# Patient Record
Sex: MALE | Race: WHITE | NOT HISPANIC OR LATINO
[De-identification: names, ages, dates, MRNs, and addresses within clinical notes are randomized per-mention and may not be internally consistent; named-entity substitution may affect disease eponyms.]

---

## 2019-02-25 ENCOUNTER — RECORD ABSTRACTING (OUTPATIENT)
Age: 69
End: 2019-02-25

## 2019-02-25 DIAGNOSIS — Z87.19 PERSONAL HISTORY OF OTHER DISEASES OF THE DIGESTIVE SYSTEM: ICD-10-CM

## 2019-02-25 DIAGNOSIS — Z80.0 FAMILY HISTORY OF MALIGNANT NEOPLASM OF DIGESTIVE ORGANS: ICD-10-CM

## 2019-02-25 DIAGNOSIS — Z78.9 OTHER SPECIFIED HEALTH STATUS: ICD-10-CM

## 2019-06-24 ENCOUNTER — NON-APPOINTMENT (OUTPATIENT)
Age: 69
End: 2019-06-24

## 2019-06-24 ENCOUNTER — APPOINTMENT (OUTPATIENT)
Dept: INTERNAL MEDICINE | Facility: CLINIC | Age: 69
End: 2019-06-24
Payer: MEDICARE

## 2019-06-24 VITALS
WEIGHT: 192.5 LBS | HEIGHT: 68 IN | DIASTOLIC BLOOD PRESSURE: 78 MMHG | BODY MASS INDEX: 29.18 KG/M2 | OXYGEN SATURATION: 98 % | SYSTOLIC BLOOD PRESSURE: 110 MMHG | RESPIRATION RATE: 16 BRPM | HEART RATE: 59 BPM

## 2019-06-24 VITALS — TEMPERATURE: 98 F

## 2019-06-24 DIAGNOSIS — Z78.9 OTHER SPECIFIED HEALTH STATUS: ICD-10-CM

## 2019-06-24 DIAGNOSIS — Z86.010 PERSONAL HISTORY OF COLONIC POLYPS: ICD-10-CM

## 2019-06-24 PROCEDURE — 93000 ELECTROCARDIOGRAM COMPLETE: CPT

## 2019-06-24 PROCEDURE — 36415 COLL VENOUS BLD VENIPUNCTURE: CPT

## 2019-06-24 PROCEDURE — 94010 BREATHING CAPACITY TEST: CPT

## 2019-06-24 PROCEDURE — G0439: CPT

## 2019-06-24 NOTE — PHYSICAL EXAM
[No Acute Distress] : no acute distress [Well Nourished] : well nourished [Well-Appearing] : well-appearing [Well Developed] : well developed [Normal Sclera/Conjunctiva] : normal sclera/conjunctiva [PERRL] : pupils equal round and reactive to light [EOMI] : extraocular movements intact [Normal Oropharynx] : the oropharynx was normal [Normal Outer Ear/Nose] : the outer ears and nose were normal in appearance [No JVD] : no jugular venous distention [Supple] : supple [No Lymphadenopathy] : no lymphadenopathy [Thyroid Normal, No Nodules] : the thyroid was normal and there were no nodules present [No Respiratory Distress] : no respiratory distress  [Clear to Auscultation] : lungs were clear to auscultation bilaterally [No Accessory Muscle Use] : no accessory muscle use [Normal Rate] : normal rate  [Regular Rhythm] : with a regular rhythm [Normal S1, S2] : normal S1 and S2 [No Murmur] : no murmur heard [No Carotid Bruits] : no carotid bruits [No Abdominal Bruit] : a ~M bruit was not heard ~T in the abdomen [No Varicosities] : no varicosities [Pedal Pulses Present] : the pedal pulses are present [No Edema] : there was no peripheral edema [No Palpable Aorta] : no palpable aorta [No Extremity Clubbing/Cyanosis] : no extremity clubbing/cyanosis [Non Tender] : non-tender [Soft] : abdomen soft [No Masses] : no abdominal mass palpated [Non-distended] : non-distended [No HSM] : no HSM [Normal Bowel Sounds] : normal bowel sounds [Normal Posterior Cervical Nodes] : no posterior cervical lymphadenopathy [Normal Anterior Cervical Nodes] : no anterior cervical lymphadenopathy [No CVA Tenderness] : no CVA  tenderness [No Joint Swelling] : no joint swelling [No Spinal Tenderness] : no spinal tenderness [Grossly Normal Strength/Tone] : grossly normal strength/tone [Normal Gait] : normal gait [No Rash] : no rash [Coordination Grossly Intact] : coordination grossly intact [No Focal Deficits] : no focal deficits [Deep Tendon Reflexes (DTR)] : deep tendon reflexes were 2+ and symmetric [Normal Affect] : the affect was normal [Normal Insight/Judgement] : insight and judgment were intact [No Stool to Guaiac] : no stool to guaiac [No Mass] : no mass [FreeTextEntry1] : External hemorrhoids; prostate normal
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
## 2019-06-24 NOTE — PLAN
[FreeTextEntry1] : Pulmonary\par dyspnea on exertion - check EKG/PFT (results as above) - likely secondary to lack of CV exercise/deconditioning and history of smoking - recommended increased CV exercise\par current every day smoker - check PFT (results as above) - discussed starting Chantix \par Gastroenterology \par screening colonoscopy - patient to follow up for referral to gastroenterologist as he will be due in 1 year given history of colonic polyps and family history of colon cancer \par Infectious Disease\par Tetanus Vaccine - discussed, refused \par \par check EKG, PFT, male panel, UA  \par \par  \par

## 2019-06-24 NOTE — ADDENDUM
[FreeTextEntry1] : I, Blayne Pedroza, acted solely as scribe for Dr. Jaspal Bermeo DO on this date 06/24/2019 10:00AM .\par \par All medical record entries made by the Scribe were at my, Dr. Jaspal Bermeo DO direction and personally dictated by me on 06/24/2019 10:00AM. I have reviewed the chart and agree that the record accurately reflects my personal performance of the history, physical exam, assessment and plan. I have also personally directed, reviewed and agreed with the chart.\par

## 2019-06-24 NOTE — ASSESSMENT
[FreeTextEntry1] : Patient is a 68 year old male with a past medical history as above who presents for annual physical exam.

## 2019-06-30 LAB
25(OH)D3 SERPL-MCNC: 32.3 NG/ML
ALBUMIN SERPL ELPH-MCNC: 4.8 G/DL
ALP BLD-CCNC: 60 U/L
ALT SERPL-CCNC: 17 U/L
ANION GAP SERPL CALC-SCNC: 13 MMOL/L
APPEARANCE: CLEAR
AST SERPL-CCNC: 13 U/L
BASOPHILS # BLD AUTO: 0.03 K/UL
BASOPHILS NFR BLD AUTO: 0.5 %
BILIRUB SERPL-MCNC: 0.5 MG/DL
BILIRUBIN URINE: NEGATIVE
BLOOD URINE: NEGATIVE
BUN SERPL-MCNC: 15 MG/DL
CALCIUM SERPL-MCNC: 9.9 MG/DL
CHLORIDE SERPL-SCNC: 106 MMOL/L
CHOLEST SERPL-MCNC: 120 MG/DL
CHOLEST/HDLC SERPL: 3.2 RATIO
CO2 SERPL-SCNC: 26 MMOL/L
COLOR: NORMAL
CREAT SERPL-MCNC: 1.07 MG/DL
EOSINOPHIL # BLD AUTO: 0.41 K/UL
EOSINOPHIL NFR BLD AUTO: 7.2 %
ESTIMATED AVERAGE GLUCOSE: 120 MG/DL
GLUCOSE QUALITATIVE U: NEGATIVE
GLUCOSE SERPL-MCNC: 103 MG/DL
HBA1C MFR BLD HPLC: 5.8 %
HCT VFR BLD CALC: 48.4 %
HDLC SERPL-MCNC: 37 MG/DL
HGB BLD-MCNC: 15.4 G/DL
IMM GRANULOCYTES NFR BLD AUTO: 0 %
KETONES URINE: NEGATIVE
LDLC SERPL CALC-MCNC: 65 MG/DL
LEUKOCYTE ESTERASE URINE: NEGATIVE
LYMPHOCYTES # BLD AUTO: 1.36 K/UL
LYMPHOCYTES NFR BLD AUTO: 23.9 %
MAN DIFF?: NORMAL
MCHC RBC-ENTMCNC: 29.7 PG
MCHC RBC-ENTMCNC: 31.8 GM/DL
MCV RBC AUTO: 93.4 FL
MONOCYTES # BLD AUTO: 0.52 K/UL
MONOCYTES NFR BLD AUTO: 9.1 %
NEUTROPHILS # BLD AUTO: 3.38 K/UL
NEUTROPHILS NFR BLD AUTO: 59.3 %
NITRITE URINE: NEGATIVE
PH URINE: 5
PLATELET # BLD AUTO: 121 K/UL
POTASSIUM SERPL-SCNC: 4.6 MMOL/L
PROT SERPL-MCNC: 7.3 G/DL
PROTEIN URINE: NEGATIVE
PSA SERPL-MCNC: 2.98 NG/ML
RBC # BLD: 5.18 M/UL
RBC # FLD: 13.5 %
SODIUM SERPL-SCNC: 145 MMOL/L
SPECIFIC GRAVITY URINE: 1.02
TRIGL SERPL-MCNC: 89 MG/DL
TSH SERPL-ACNC: 2.17 UIU/ML
UROBILINOGEN URINE: NORMAL
WBC # FLD AUTO: 5.7 K/UL

## 2020-06-24 ENCOUNTER — NON-APPOINTMENT (OUTPATIENT)
Age: 70
End: 2020-06-24

## 2020-06-24 ENCOUNTER — LABORATORY RESULT (OUTPATIENT)
Age: 70
End: 2020-06-24

## 2020-06-24 ENCOUNTER — APPOINTMENT (OUTPATIENT)
Dept: INTERNAL MEDICINE | Facility: CLINIC | Age: 70
End: 2020-06-24
Payer: MEDICARE

## 2020-06-24 VITALS
OXYGEN SATURATION: 97 % | WEIGHT: 188 LBS | HEIGHT: 68 IN | TEMPERATURE: 98.2 F | BODY MASS INDEX: 28.49 KG/M2 | RESPIRATION RATE: 16 BRPM | SYSTOLIC BLOOD PRESSURE: 110 MMHG | HEART RATE: 65 BPM | DIASTOLIC BLOOD PRESSURE: 66 MMHG

## 2020-06-24 PROCEDURE — G0439: CPT

## 2020-06-24 PROCEDURE — 36415 COLL VENOUS BLD VENIPUNCTURE: CPT

## 2020-06-24 PROCEDURE — 93000 ELECTROCARDIOGRAM COMPLETE: CPT

## 2020-06-24 NOTE — PLAN
[FreeTextEntry1] : Pulmonary\par current every-day smoker - check EKG (results as above) - advised decreased smoking/smoking cessation\par Endocrinology\par hyperglycemia - continue low carbohydrate/low sugar diet - check hemoglobin A1C\par Hematology\par thrombocytopenia - check CBC\par \par check EKG (results as above), male panel, and UA

## 2020-06-24 NOTE — PHYSICAL EXAM
[No Acute Distress] : no acute distress [Well Developed] : well developed [Well Nourished] : well nourished [Well-Appearing] : well-appearing [Normal Sclera/Conjunctiva] : normal sclera/conjunctiva [PERRL] : pupils equal round and reactive to light [Normal Outer Ear/Nose] : the outer ears and nose were normal in appearance [EOMI] : extraocular movements intact [Normal Oropharynx] : the oropharynx was normal [No Lymphadenopathy] : no lymphadenopathy [Supple] : supple [No JVD] : no jugular venous distention [Thyroid Normal, No Nodules] : the thyroid was normal and there were no nodules present [No Respiratory Distress] : no respiratory distress  [No Accessory Muscle Use] : no accessory muscle use [Clear to Auscultation] : lungs were clear to auscultation bilaterally [Normal Rate] : normal rate  [Regular Rhythm] : with a regular rhythm [No Murmur] : no murmur heard [Normal S1, S2] : normal S1 and S2 [No Carotid Bruits] : no carotid bruits [No Abdominal Bruit] : a ~M bruit was not heard ~T in the abdomen [No Varicosities] : no varicosities [Pedal Pulses Present] : the pedal pulses are present [No Palpable Aorta] : no palpable aorta [No Extremity Clubbing/Cyanosis] : no extremity clubbing/cyanosis [No Edema] : there was no peripheral edema [Non Tender] : non-tender [Soft] : abdomen soft [No Masses] : no abdominal mass palpated [Non-distended] : non-distended [No HSM] : no HSM [Normal Bowel Sounds] : normal bowel sounds [Normal Anterior Cervical Nodes] : no anterior cervical lymphadenopathy [Normal Posterior Cervical Nodes] : no posterior cervical lymphadenopathy [No Spinal Tenderness] : no spinal tenderness [No CVA Tenderness] : no CVA  tenderness [Grossly Normal Strength/Tone] : grossly normal strength/tone [No Joint Swelling] : no joint swelling [No Rash] : no rash [Coordination Grossly Intact] : coordination grossly intact [No Focal Deficits] : no focal deficits [Normal Gait] : normal gait [Normal Affect] : the affect was normal [Deep Tendon Reflexes (DTR)] : deep tendon reflexes were 2+ and symmetric [Normal Insight/Judgement] : insight and judgment were intact [de-identified] : overweight

## 2020-06-24 NOTE — ASSESSMENT
[FreeTextEntry1] : Patient is a 69 year old male with a past medical history as above who presents for an annual wellness visit.

## 2020-06-24 NOTE — ADDENDUM
[FreeTextEntry1] : I, Blayne Pedroza, acted solely as scribe for Dr. Jaspal Beremo DO on this date 06/24/2020 10:10AM .\par \par All medical record entries made by the Scribe were at my, Dr. Jaspal Bermeo DO direction and personally dictated by me on 06/24/2020 10:10AM. I have reviewed the chart and agree that the record accurately reflects my personal performance of the history, physical exam, assessment and plan. I have also personally directed, reviewed and agreed with the chart.\par

## 2020-06-24 NOTE — HISTORY OF PRESENT ILLNESS
[de-identified] : Patient is a 69 year old male with a past medical history as below who presents for an annual wellness visit. Patient is not currently taking any prescription medications. Patient notes increased stress secondary to the quarantine. He states he rescheduled his trip to [place] due to COVID-19. Patient is a current every-day smoker, but is not willing to quit smoking.  [FreeTextEntry1] : annual wellness visit

## 2020-06-25 LAB
25(OH)D3 SERPL-MCNC: 32.1 NG/ML
ALBUMIN SERPL ELPH-MCNC: 4.6 G/DL
ALP BLD-CCNC: 52 U/L
ALT SERPL-CCNC: 16 U/L
ANION GAP SERPL CALC-SCNC: 11 MMOL/L
APPEARANCE: CLEAR
AST SERPL-CCNC: 18 U/L
BASOPHILS # BLD AUTO: 0.03 K/UL
BASOPHILS NFR BLD AUTO: 0.5 %
BILIRUB SERPL-MCNC: 0.5 MG/DL
BILIRUBIN URINE: NEGATIVE
BLOOD URINE: ABNORMAL
BUN SERPL-MCNC: 11 MG/DL
CALCIUM SERPL-MCNC: 9.6 MG/DL
CHLORIDE SERPL-SCNC: 105 MMOL/L
CHOLEST SERPL-MCNC: 127 MG/DL
CHOLEST/HDLC SERPL: 3.5 RATIO
CO2 SERPL-SCNC: 24 MMOL/L
COLOR: YELLOW
CREAT SERPL-MCNC: 0.95 MG/DL
EOSINOPHIL # BLD AUTO: 0.42 K/UL
EOSINOPHIL NFR BLD AUTO: 6.8 %
ESTIMATED AVERAGE GLUCOSE: 117 MG/DL
GLUCOSE QUALITATIVE U: NEGATIVE
GLUCOSE SERPL-MCNC: 99 MG/DL
HBA1C MFR BLD HPLC: 5.7 %
HCT VFR BLD CALC: 49.2 %
HDLC SERPL-MCNC: 37 MG/DL
HGB BLD-MCNC: 15.4 G/DL
IMM GRANULOCYTES NFR BLD AUTO: 0.2 %
KETONES URINE: NEGATIVE
LDLC SERPL CALC-MCNC: 72 MG/DL
LEUKOCYTE ESTERASE URINE: NEGATIVE
LYMPHOCYTES # BLD AUTO: 1.39 K/UL
LYMPHOCYTES NFR BLD AUTO: 22.5 %
MAN DIFF?: NORMAL
MCHC RBC-ENTMCNC: 29.6 PG
MCHC RBC-ENTMCNC: 31.3 GM/DL
MCV RBC AUTO: 94.4 FL
MONOCYTES # BLD AUTO: 0.54 K/UL
MONOCYTES NFR BLD AUTO: 8.7 %
NEUTROPHILS # BLD AUTO: 3.79 K/UL
NEUTROPHILS NFR BLD AUTO: 61.3 %
NITRITE URINE: NEGATIVE
PH URINE: 5.5
PLATELET # BLD AUTO: 117 K/UL
POTASSIUM SERPL-SCNC: 4.6 MMOL/L
PROT SERPL-MCNC: 6.9 G/DL
PROTEIN URINE: NEGATIVE
PSA SERPL-MCNC: 2.86 NG/ML
RBC # BLD: 5.21 M/UL
RBC # FLD: 13.4 %
SODIUM SERPL-SCNC: 140 MMOL/L
SPECIFIC GRAVITY URINE: 1.02
TRIGL SERPL-MCNC: 92 MG/DL
TSH SERPL-ACNC: 2.22 UIU/ML
UROBILINOGEN URINE: NORMAL
WBC # FLD AUTO: 6.18 K/UL

## 2021-03-01 ENCOUNTER — APPOINTMENT (OUTPATIENT)
Dept: INTERNAL MEDICINE | Facility: CLINIC | Age: 71
End: 2021-03-01
Payer: MEDICARE

## 2021-03-01 VITALS
RESPIRATION RATE: 13 BRPM | SYSTOLIC BLOOD PRESSURE: 101 MMHG | WEIGHT: 190 LBS | TEMPERATURE: 97.7 F | BODY MASS INDEX: 28.79 KG/M2 | OXYGEN SATURATION: 98 % | DIASTOLIC BLOOD PRESSURE: 70 MMHG | HEIGHT: 68 IN | HEART RATE: 71 BPM

## 2021-03-01 DIAGNOSIS — M79.671 PAIN IN RIGHT FOOT: ICD-10-CM

## 2021-03-01 PROCEDURE — 36415 COLL VENOUS BLD VENIPUNCTURE: CPT

## 2021-03-01 PROCEDURE — 99213 OFFICE O/P EST LOW 20 MIN: CPT | Mod: 25

## 2021-03-01 NOTE — HISTORY OF PRESENT ILLNESS
[FreeTextEntry8] : Patient is a 70 year old male with a past medical history as below who presents for right foot pain (dorsal aspect) that started this morning. He states the pain radiates up to his right ankle. He characterizes the pain as sharp. He states the pain occurs every several minutes and resolves on its own. He denies any exacerbating/alleviating factors. He took 2 doses of Advil this morning without relief. Patient had a screening colonoscopy with gastroenterologist, Dr. Parmar in June/July 2020. Patient did not receive the flu vaccine for this season. He has not received the Pneumonia Vaccine. He notes recently receiving the 2nd dose of the COVID-19 Vaccine.

## 2021-03-01 NOTE — PLAN
[FreeTextEntry1] : Podiatry/Musculoskeletal\par right foot pain - start Methylprednisolone 4mg p.o. as directed, Rx filled - check serum uric acid to r/o gout - Rx given for X-Ray of right foot; imaging to be done pending results of blood work - referred to podiatrist, Dr. Maldonado for further evaluation\par Pulmonary\par current every-day smoker - previously discussed importance of smoking cessation in reducing CV risk\par Endocrinology\par hyperglycemia - continue low carbohydrate/low sugar diet \par Gastroenterology\par colonoscopy - results of last screening to be requested from gastroenterologist, Dr. Parmar's office\par Immunization\par Pneumovax 23 - discussed, vaccine to be administered during future visit; Prevnar 13 to be administered 1 year later\par \par check serum uric acid

## 2021-03-01 NOTE — PHYSICAL EXAM
[No Acute Distress] : no acute distress [Well Nourished] : well nourished [Well Developed] : well developed [Well-Appearing] : well-appearing [Normal Sclera/Conjunctiva] : normal sclera/conjunctiva [PERRL] : pupils equal round and reactive to light [EOMI] : extraocular movements intact [Normal Outer Ear/Nose] : the outer ears and nose were normal in appearance [Normal Oropharynx] : the oropharynx was normal [No JVD] : no jugular venous distention [No Lymphadenopathy] : no lymphadenopathy [Supple] : supple [Thyroid Normal, No Nodules] : the thyroid was normal and there were no nodules present [No Respiratory Distress] : no respiratory distress  [No Accessory Muscle Use] : no accessory muscle use [Clear to Auscultation] : lungs were clear to auscultation bilaterally [Normal Rate] : normal rate  [Regular Rhythm] : with a regular rhythm [Normal S1, S2] : normal S1 and S2 [No Murmur] : no murmur heard [No Carotid Bruits] : no carotid bruits [No Abdominal Bruit] : a ~M bruit was not heard ~T in the abdomen [No Varicosities] : no varicosities [Pedal Pulses Present] : the pedal pulses are present [No Edema] : there was no peripheral edema [No Palpable Aorta] : no palpable aorta [No Extremity Clubbing/Cyanosis] : no extremity clubbing/cyanosis [Soft] : abdomen soft [Non Tender] : non-tender [Non-distended] : non-distended [No Masses] : no abdominal mass palpated [No HSM] : no HSM [Normal Bowel Sounds] : normal bowel sounds [Normal Posterior Cervical Nodes] : no posterior cervical lymphadenopathy [Normal Anterior Cervical Nodes] : no anterior cervical lymphadenopathy [No CVA Tenderness] : no CVA  tenderness [No Spinal Tenderness] : no spinal tenderness [No Joint Swelling] : no joint swelling [Grossly Normal Strength/Tone] : grossly normal strength/tone [No Rash] : no rash [Coordination Grossly Intact] : coordination grossly intact [No Focal Deficits] : no focal deficits [Normal Gait] : normal gait [Deep Tendon Reflexes (DTR)] : deep tendon reflexes were 2+ and symmetric [Normal Affect] : the affect was normal [Normal Insight/Judgement] : insight and judgment were intact [de-identified] : overweight

## 2021-03-01 NOTE — ASSESSMENT
[FreeTextEntry1] : Patient is a 70 year old male with a past medical history as above who presents for right foot pain.

## 2021-03-01 NOTE — ADDENDUM
[FreeTextEntry1] : I, Blayne Pedroza, acted solely as scribe for Dr. Jaspal Bermeo DO on this date 03/01/2021  1:00PM .\par \par All medical record entries made by the Scribe were at my, Dr. Jaspal Bermeo DO direction and personally dictated by me on 03/01/2021  1:00PM. I have reviewed the chart and agree that the record accurately reflects my personal performance of the history, physical exam, assessment and plan. I have also personally directed, reviewed and agreed with the chart.\par

## 2021-03-02 LAB — URATE SERPL-MCNC: 4.8 MG/DL

## 2021-05-23 ENCOUNTER — EMERGENCY (EMERGENCY)
Facility: HOSPITAL | Age: 71
LOS: 1 days | Discharge: HOME | End: 2021-05-23
Attending: EMERGENCY MEDICINE | Admitting: EMERGENCY MEDICINE
Payer: MEDICARE

## 2021-05-23 VITALS
DIASTOLIC BLOOD PRESSURE: 76 MMHG | HEART RATE: 75 BPM | SYSTOLIC BLOOD PRESSURE: 139 MMHG | HEIGHT: 70 IN | RESPIRATION RATE: 22 BRPM | WEIGHT: 188.05 LBS | TEMPERATURE: 98 F | OXYGEN SATURATION: 94 %

## 2021-05-23 PROCEDURE — 71045 X-RAY EXAM CHEST 1 VIEW: CPT | Mod: 26

## 2021-05-23 PROCEDURE — 99284 EMERGENCY DEPT VISIT MOD MDM: CPT | Mod: CS

## 2021-05-23 RX ORDER — IPRATROPIUM/ALBUTEROL SULFATE 18-103MCG
3 AEROSOL WITH ADAPTER (GRAM) INHALATION ONCE
Refills: 0 | Status: COMPLETED | OUTPATIENT
Start: 2021-05-23 | End: 2021-05-23

## 2021-05-23 RX ADMIN — Medication 3 MILLILITER(S): at 23:46

## 2021-05-23 NOTE — ED ADULT NURSE NOTE - DISCHARGE DATE/TIME
States that his left thigh became numb today while on a plane from [place]. No redness or swelling noted 24-May-2021 01:15

## 2021-05-23 NOTE — ED PROVIDER NOTE - PATIENT PORTAL LINK FT
You can access the FollowMyHealth Patient Portal offered by [facility] by registering at the following website: [email or web address]. By joining [facility]’s FollowMyHealth portal, you will also be able to view your health information using other applications (apps) compatible with our system.

## 2021-05-23 NOTE — ED ADULT NURSE NOTE - OBJECTIVE STATEMENT
pt walked in c/o shortness of breath all day with infrequent cough, denies fever/chills or sick contacts.

## 2021-05-23 NOTE — ED PROVIDER NOTE - CARE PROVIDER_API CALL
Jack Paige)  Critical Care Medicine; Internal Medicine; Pulmonary Disease  [address] [place] [address]  Phone: [phone]  Fax: [phone]  Follow Up Time: 1-3 Days

## 2021-05-23 NOTE — ED PROVIDER NOTE - PROGRESS NOTE DETAILS
Patient states he feels much better and wants to go home. Some mild residual expiratory wheezing. Patient knows to return if worsening

## 2021-05-24 VITALS
SYSTOLIC BLOOD PRESSURE: 145 MMHG | HEART RATE: 74 BPM | TEMPERATURE: 98 F | DIASTOLIC BLOOD PRESSURE: 74 MMHG | OXYGEN SATURATION: 97 % | RESPIRATION RATE: 18 BRPM

## 2021-05-24 LAB — SARS-COV-2 RNA SPEC QL NAA+PROBE: SIGNIFICANT CHANGE UP

## 2021-05-24 PROCEDURE — 87635 SARS-COV-2 COVID-19 AMP PRB: CPT

## 2021-05-24 PROCEDURE — 94640 AIRWAY INHALATION TREATMENT: CPT

## 2021-05-24 PROCEDURE — 99283 EMERGENCY DEPT VISIT LOW MDM: CPT | Mod: 25

## 2021-05-24 PROCEDURE — 71045 X-RAY EXAM CHEST 1 VIEW: CPT

## 2021-05-24 RX ORDER — IPRATROPIUM/ALBUTEROL SULFATE 18-103MCG
3 AEROSOL WITH ADAPTER (GRAM) INHALATION ONCE
Refills: 0 | Status: COMPLETED | OUTPATIENT
Start: 2021-05-24 | End: 2021-05-24

## 2021-05-24 RX ORDER — ALBUTEROL 90 UG/1
2 AEROSOL, METERED ORAL
Qty: 1 | Refills: 0
Start: 2021-05-24

## 2021-05-24 RX ORDER — ALBUTEROL 90 UG/1
2.5 AEROSOL, METERED ORAL ONCE
Refills: 0 | Status: COMPLETED | OUTPATIENT
Start: 2021-05-24 | End: 2021-05-24

## 2021-05-24 RX ORDER — ALBUTEROL 90 UG/1
2 AEROSOL, METERED ORAL
Refills: 0 | Status: DISCONTINUED | OUTPATIENT
Start: 2021-05-24 | End: 2021-05-27

## 2021-05-24 RX ADMIN — Medication 3 MILLILITER(S): at 00:30

## 2021-05-24 RX ADMIN — Medication 3 MILLILITER(S): at 00:43

## 2021-05-24 RX ADMIN — Medication 60 MILLIGRAM(S): at 00:21

## 2021-05-24 RX ADMIN — ALBUTEROL 2 PUFF(S): 90 AEROSOL, METERED ORAL at 01:12

## 2021-05-24 RX ADMIN — ALBUTEROL 2.5 MILLIGRAM(S): 90 AEROSOL, METERED ORAL at 00:52

## 2021-05-24 NOTE — ED ADULT NURSE REASSESSMENT NOTE - NS ED NURSE REASSESS COMMENT FT1
wheezing improved after nebulization, VSS, no distress noted, pt reevaluated by MD, pt d/c home with f/u instructions.

## 2021-06-01 RX ORDER — BUDESONIDE AND FORMOTEROL FUMARATE DIHYDRATE 160; 4.5 UG/1; UG/1
160-4.5 AEROSOL RESPIRATORY (INHALATION)
Qty: 1 | Refills: 2 | Status: DISCONTINUED | COMMUNITY
Start: 2021-05-31 | End: 2021-06-01

## 2021-08-25 ENCOUNTER — NON-APPOINTMENT (OUTPATIENT)
Age: 71
End: 2021-08-25

## 2021-08-25 ENCOUNTER — APPOINTMENT (OUTPATIENT)
Dept: INTERNAL MEDICINE | Facility: CLINIC | Age: 71
End: 2021-08-25
Payer: MEDICARE

## 2021-08-25 VITALS
TEMPERATURE: 97.3 F | DIASTOLIC BLOOD PRESSURE: 70 MMHG | OXYGEN SATURATION: 98 % | BODY MASS INDEX: 28.85 KG/M2 | SYSTOLIC BLOOD PRESSURE: 118 MMHG | RESPIRATION RATE: 16 BRPM | HEART RATE: 65 BPM | WEIGHT: 190.4 LBS | HEIGHT: 68 IN

## 2021-08-25 DIAGNOSIS — Z20.822 CONTACT WITH AND (SUSPECTED) EXPOSURE TO COVID-19: ICD-10-CM

## 2021-08-25 PROCEDURE — 36415 COLL VENOUS BLD VENIPUNCTURE: CPT

## 2021-08-25 PROCEDURE — G0442 ANNUAL ALCOHOL SCREEN 15 MIN: CPT | Mod: 59

## 2021-08-25 PROCEDURE — G0439: CPT

## 2021-08-25 PROCEDURE — 93000 ELECTROCARDIOGRAM COMPLETE: CPT | Mod: 59

## 2021-08-25 RX ORDER — METHYLPREDNISOLONE 4 MG/1
4 TABLET ORAL
Qty: 1 | Refills: 0 | Status: DISCONTINUED | COMMUNITY
Start: 2021-03-01 | End: 2021-08-25

## 2021-08-25 RX ORDER — IPRATROPIUM BROMIDE 17 UG/1
17 AEROSOL, METERED RESPIRATORY (INHALATION) 4 TIMES DAILY
Qty: 1 | Refills: 2 | Status: DISCONTINUED | COMMUNITY
Start: 2021-06-01 | End: 2021-08-25

## 2021-08-25 NOTE — PHYSICAL EXAM
[No Acute Distress] : no acute distress [Well Nourished] : well nourished [Well Developed] : well developed [Well-Appearing] : well-appearing [Normal Sclera/Conjunctiva] : normal sclera/conjunctiva [PERRL] : pupils equal round and reactive to light [EOMI] : extraocular movements intact [Normal Outer Ear/Nose] : the outer ears and nose were normal in appearance [Normal Oropharynx] : the oropharynx was normal [No JVD] : no jugular venous distention [No Lymphadenopathy] : no lymphadenopathy [Supple] : supple [Thyroid Normal, No Nodules] : the thyroid was normal and there were no nodules present [No Respiratory Distress] : no respiratory distress  [No Accessory Muscle Use] : no accessory muscle use [Clear to Auscultation] : lungs were clear to auscultation bilaterally [Normal Rate] : normal rate  [Regular Rhythm] : with a regular rhythm [Normal S1, S2] : normal S1 and S2 [No Murmur] : no murmur heard [No Carotid Bruits] : no carotid bruits [No Abdominal Bruit] : a ~M bruit was not heard ~T in the abdomen [No Varicosities] : no varicosities [Pedal Pulses Present] : the pedal pulses are present [No Edema] : there was no peripheral edema [No Palpable Aorta] : no palpable aorta [No Extremity Clubbing/Cyanosis] : no extremity clubbing/cyanosis [Soft] : abdomen soft [Non Tender] : non-tender [Non-distended] : non-distended [No Masses] : no abdominal mass palpated [No HSM] : no HSM [Normal Bowel Sounds] : normal bowel sounds [Normal Posterior Cervical Nodes] : no posterior cervical lymphadenopathy [Normal Anterior Cervical Nodes] : no anterior cervical lymphadenopathy [No CVA Tenderness] : no CVA  tenderness [No Spinal Tenderness] : no spinal tenderness [No Joint Swelling] : no joint swelling [Grossly Normal Strength/Tone] : grossly normal strength/tone [No Rash] : no rash [Coordination Grossly Intact] : coordination grossly intact [No Focal Deficits] : no focal deficits [Normal Gait] : normal gait [Deep Tendon Reflexes (DTR)] : deep tendon reflexes were 2+ and symmetric [Normal Affect] : the affect was normal [Normal Insight/Judgement] : insight and judgment were intact [de-identified] : overweight

## 2021-08-25 NOTE — ASSESSMENT
[FreeTextEntry1] : Patient is a 70 year old male with a past medical history as above who presents for an annual wellness visit.\par

## 2021-08-25 NOTE — HISTORY OF PRESENT ILLNESS
[FreeTextEntry1] : annual wellness visit [de-identified] : Patient is a 70 year old male with a past medical history as below who presents for an annual wellness visit. Patient is not currently taking any prescription medications. Patient had a screening colonoscopy with gastroenterologist, Dr. Parmar in June/July 2020. Patient notes minimal CV activity (walking, golf). Patient did not receive the flu vaccine last year. He has not received the Pneumonia Vaccine. He has not received the Tdap/Td Vaccine in the past 10 years. He has not received the Shingles (Shingrix) Vaccine. Patient has received both doses of the COVID-19 Vaccine (Pfizer). He inquires about testing for COVID-19 Antibodies with blood work today. Patient is a current every-day smoker.

## 2021-08-25 NOTE — HEALTH RISK ASSESSMENT
[] : Yes [No] : In the past 12 months have you used drugs other than those required for medical reasons? No [0] : 1) Little interest or pleasure doing things: Not at all (0) [PHQ-2 Negative - No further assessment needed] : PHQ-2 Negative - No further assessment needed [Fully functional (bathing, dressing, toileting, transferring, walking, feeding)] : Fully functional (bathing, dressing, toileting, transferring, walking, feeding) [Fully functional (using the telephone, shopping, preparing meals, housekeeping, doing laundry, using] : Fully functional and needs no help or supervision to perform IADLs (using the telephone, shopping, preparing meals, housekeeping, doing laundry, using transportation, managing medications and managing finances) [de-identified] : Daily or almost daily.  [Audit-CScore] : 0 [de-identified] : Walking, golf.  [TEI0Nbpqs] : 0 [ColonoscopyComments] : Results of last screening to be requested from Dr. Parmra's office.

## 2021-08-25 NOTE — ADDENDUM
[FreeTextEntry1] : I, Blayne Pedroza, acted solely as scribe for Dr. Jaspal Bermeo DO on this date 08/25/2021 12:10PM .\par \par All medical record entries made by the Scribe were at my, Dr. Jaspal Bermeo DO direction and personally dictated by me on 08/25/2021 12:10PM. I have reviewed the chart and agree that the record accurately reflects my personal performance of the history, physical exam, assessment and plan. I have also personally directed, reviewed and agreed with the chart.\par

## 2021-08-25 NOTE — PLAN
[FreeTextEntry1] : Pulmonary\par current every-day smoker - check EKG (results as above) - again discussed the importance of smoking cessation in reducing risk for CV disease; discussed starting Chantix, patient to consider and follow up when ready/willing to quit smoking\par Endocrinology\par hyperglycemia - continue low carbohydrate/low sugar diet - check hemoglobin A1C\par Hematology\par thrombocytopenia - check CBC\par Gastroenterology\par Results of last screening colonoscopy to be requested from gastroenterologist, Dr. Parmar's office\par Immunization\par Pneumovax 23/Prevnar 13 - again discussed, patient to consider and follow up\par Tdap (Adacel) Vaccine - again discussed, declined\par Shingrix - again discussed, patient to consider and follow up\par Immunization/Infectious Disease\par S/p COVID-19 Vaccine (Pfizer, x2) - check COVID-19 James Domain Antibody and COVID-19 Nucleocapsid Antibody;  Recommended receiving 3rd dose of the COVID-19 Vaccine (Pfizer) 8 months after date of 2nd dose\par \par check EKG (results as above), male panel, hemoglobin A1C, COVID-19 James Domain Antibody, COVID-19 Nucleocapsid Antibody, and UA w/ Reflex Urine Culture

## 2021-08-31 LAB
25(OH)D3 SERPL-MCNC: 42.7 NG/ML
ALBUMIN SERPL ELPH-MCNC: 5 G/DL
ALP BLD-CCNC: 67 U/L
ALT SERPL-CCNC: 13 U/L
ANION GAP SERPL CALC-SCNC: 17 MMOL/L
APPEARANCE: CLEAR
AST SERPL-CCNC: 19 U/L
BASOPHILS # BLD AUTO: 0.04 K/UL
BASOPHILS NFR BLD AUTO: 0.6 %
BILIRUB SERPL-MCNC: 0.4 MG/DL
BILIRUBIN URINE: NEGATIVE
BLOOD URINE: NEGATIVE
BUN SERPL-MCNC: 11 MG/DL
CALCIUM SERPL-MCNC: 9.8 MG/DL
CHLORIDE SERPL-SCNC: 104 MMOL/L
CHOLEST SERPL-MCNC: 133 MG/DL
CO2 SERPL-SCNC: 22 MMOL/L
COLOR: NORMAL
COVID-19 NUCLEOCAPSID  GAM ANTIBODY INTERPRETATION: NEGATIVE
COVID-19 SPIKE DOMAIN ANTIBODY INTERPRETATION: POSITIVE
CREAT SERPL-MCNC: 1.03 MG/DL
EOSINOPHIL # BLD AUTO: 0.46 K/UL
EOSINOPHIL NFR BLD AUTO: 7.1 %
ESTIMATED AVERAGE GLUCOSE: 120 MG/DL
GLUCOSE QUALITATIVE U: NEGATIVE
GLUCOSE SERPL-MCNC: 101 MG/DL
HBA1C MFR BLD HPLC: 5.8 %
HCT VFR BLD CALC: 51.3 %
HDLC SERPL-MCNC: 36 MG/DL
HGB BLD-MCNC: 16.5 G/DL
IMM GRANULOCYTES NFR BLD AUTO: 0.2 %
KETONES URINE: NEGATIVE
LDLC SERPL CALC-MCNC: 73 MG/DL
LEUKOCYTE ESTERASE URINE: NEGATIVE
LYMPHOCYTES # BLD AUTO: 1.5 K/UL
LYMPHOCYTES NFR BLD AUTO: 23.3 %
MAN DIFF?: NORMAL
MCHC RBC-ENTMCNC: 30 PG
MCHC RBC-ENTMCNC: 32.2 GM/DL
MCV RBC AUTO: 93.3 FL
MONOCYTES # BLD AUTO: 0.64 K/UL
MONOCYTES NFR BLD AUTO: 9.9 %
NEUTROPHILS # BLD AUTO: 3.79 K/UL
NEUTROPHILS NFR BLD AUTO: 58.9 %
NITRITE URINE: NEGATIVE
NONHDLC SERPL-MCNC: 96 MG/DL
PH URINE: 6.5
PLATELET # BLD AUTO: 134 K/UL
POTASSIUM SERPL-SCNC: 5.4 MMOL/L
PROT SERPL-MCNC: 7.4 G/DL
PROTEIN URINE: NEGATIVE
PSA SERPL-MCNC: 3.16 NG/ML
RBC # BLD: 5.5 M/UL
RBC # FLD: 13.2 %
SARS-COV-2 AB SERPL IA-ACNC: >250 U/ML
SARS-COV-2 AB SERPL QL IA: 0.08 INDEX
SODIUM SERPL-SCNC: 143 MMOL/L
SPECIFIC GRAVITY URINE: 1.02
TRIGL SERPL-MCNC: 117 MG/DL
TSH SERPL-ACNC: 1.99 UIU/ML
UROBILINOGEN URINE: NORMAL
WBC # FLD AUTO: 6.44 K/UL

## 2021-11-17 NOTE — ED ADULT NURSE NOTE - IN THE PAST 12 MONTHS HAVE YOU USED DRUGS OTHER THAN THOSE REQUIRED FOR MEDICAL REASON?
Elevated Trop to 52, down to 42 w/o chest pain. Likely Type 2 NSTEMI ico demand ischemic from hypoxia. This is a relatively poor prognostic factor but does not require acute intervention at this time. No

## 2022-02-05 DIAGNOSIS — U07.1 COVID-19: ICD-10-CM

## 2022-02-05 RX ORDER — HYDROCODONE BITARTRATE AND HOMATROPINE METHYLBROMIDE 5; 1.5 MG/5ML; MG/5ML
5-1.5 SYRUP ORAL
Qty: 200 | Refills: 0 | Status: DISCONTINUED | COMMUNITY
Start: 2022-02-05 | End: 2022-02-05

## 2022-03-10 ENCOUNTER — EMERGENCY (EMERGENCY)
Facility: HOSPITAL | Age: 72
LOS: 1 days | Discharge: HOME | End: 2022-03-10
Attending: EMERGENCY MEDICINE | Admitting: EMERGENCY MEDICINE
Payer: MEDICARE

## 2022-03-10 VITALS
HEART RATE: 74 BPM | DIASTOLIC BLOOD PRESSURE: 57 MMHG | SYSTOLIC BLOOD PRESSURE: 117 MMHG | OXYGEN SATURATION: 99 % | RESPIRATION RATE: 18 BRPM | TEMPERATURE: 98 F

## 2022-03-10 VITALS
OXYGEN SATURATION: 97 % | WEIGHT: 190.48 LBS | DIASTOLIC BLOOD PRESSURE: 84 MMHG | TEMPERATURE: 97 F | HEIGHT: 70 IN | HEART RATE: 70 BPM | RESPIRATION RATE: 22 BRPM | SYSTOLIC BLOOD PRESSURE: 146 MMHG

## 2022-03-10 LAB
ALBUMIN SERPL ELPH-MCNC: 3.7 G/DL — SIGNIFICANT CHANGE UP (ref 3.3–5)
ALP SERPL-CCNC: 65 U/L — SIGNIFICANT CHANGE UP (ref 30–120)
ALT FLD-CCNC: 21 U/L DA — SIGNIFICANT CHANGE UP (ref 10–60)
ANION GAP SERPL CALC-SCNC: 5 MMOL/L — SIGNIFICANT CHANGE UP (ref 5–17)
AST SERPL-CCNC: 15 U/L — SIGNIFICANT CHANGE UP (ref 10–40)
BASOPHILS # BLD AUTO: 0.02 K/UL — SIGNIFICANT CHANGE UP (ref 0–0.2)
BASOPHILS NFR BLD AUTO: 0.3 % — SIGNIFICANT CHANGE UP (ref 0–2)
BILIRUB SERPL-MCNC: 0.4 MG/DL — SIGNIFICANT CHANGE UP (ref 0.2–1.2)
BUN SERPL-MCNC: 12 MG/DL — SIGNIFICANT CHANGE UP (ref 7–23)
CALCIUM SERPL-MCNC: 8.2 MG/DL — LOW (ref 8.4–10.5)
CHLORIDE SERPL-SCNC: 104 MMOL/L — SIGNIFICANT CHANGE UP (ref 96–108)
CO2 SERPL-SCNC: 26 MMOL/L — SIGNIFICANT CHANGE UP (ref 22–31)
CREAT SERPL-MCNC: 1.06 MG/DL — SIGNIFICANT CHANGE UP (ref 0.5–1.3)
D DIMER BLD IA.RAPID-MCNC: <150 NG/ML DDU — SIGNIFICANT CHANGE UP
EGFR: 75 ML/MIN/1.73M2 — SIGNIFICANT CHANGE UP
EOSINOPHIL # BLD AUTO: 0.59 K/UL — HIGH (ref 0–0.5)
EOSINOPHIL NFR BLD AUTO: 9.7 % — HIGH (ref 0–6)
GLUCOSE SERPL-MCNC: 151 MG/DL — HIGH (ref 70–99)
HCT VFR BLD CALC: 46.5 % — SIGNIFICANT CHANGE UP (ref 39–50)
HGB BLD-MCNC: 15.3 G/DL — SIGNIFICANT CHANGE UP (ref 13–17)
IMM GRANULOCYTES NFR BLD AUTO: 0.2 % — SIGNIFICANT CHANGE UP (ref 0–1.5)
LYMPHOCYTES # BLD AUTO: 1.03 K/UL — SIGNIFICANT CHANGE UP (ref 1–3.3)
LYMPHOCYTES # BLD AUTO: 16.9 % — SIGNIFICANT CHANGE UP (ref 13–44)
MCHC RBC-ENTMCNC: 28.9 PG — SIGNIFICANT CHANGE UP (ref 27–34)
MCHC RBC-ENTMCNC: 32.9 GM/DL — SIGNIFICANT CHANGE UP (ref 32–36)
MCV RBC AUTO: 87.9 FL — SIGNIFICANT CHANGE UP (ref 80–100)
MONOCYTES # BLD AUTO: 0.38 K/UL — SIGNIFICANT CHANGE UP (ref 0–0.9)
MONOCYTES NFR BLD AUTO: 6.2 % — SIGNIFICANT CHANGE UP (ref 2–14)
NEUTROPHILS # BLD AUTO: 4.06 K/UL — SIGNIFICANT CHANGE UP (ref 1.8–7.4)
NEUTROPHILS NFR BLD AUTO: 66.7 % — SIGNIFICANT CHANGE UP (ref 43–77)
NRBC # BLD: 0 /100 WBCS — SIGNIFICANT CHANGE UP (ref 0–0)
PLATELET # BLD AUTO: 113 K/UL — LOW (ref 150–400)
POTASSIUM SERPL-MCNC: 3.8 MMOL/L — SIGNIFICANT CHANGE UP (ref 3.5–5.3)
POTASSIUM SERPL-SCNC: 3.8 MMOL/L — SIGNIFICANT CHANGE UP (ref 3.5–5.3)
PROT SERPL-MCNC: 7 G/DL — SIGNIFICANT CHANGE UP (ref 6–8.3)
RBC # BLD: 5.29 M/UL — SIGNIFICANT CHANGE UP (ref 4.2–5.8)
RBC # FLD: 12.8 % — SIGNIFICANT CHANGE UP (ref 10.3–14.5)
SODIUM SERPL-SCNC: 135 MMOL/L — SIGNIFICANT CHANGE UP (ref 135–145)
WBC # BLD: 6.09 K/UL — SIGNIFICANT CHANGE UP (ref 3.8–10.5)
WBC # FLD AUTO: 6.09 K/UL — SIGNIFICANT CHANGE UP (ref 3.8–10.5)

## 2022-03-10 PROCEDURE — 93005 ELECTROCARDIOGRAM TRACING: CPT

## 2022-03-10 PROCEDURE — 96374 THER/PROPH/DIAG INJ IV PUSH: CPT

## 2022-03-10 PROCEDURE — 94640 AIRWAY INHALATION TREATMENT: CPT

## 2022-03-10 PROCEDURE — 93010 ELECTROCARDIOGRAM REPORT: CPT

## 2022-03-10 PROCEDURE — 85379 FIBRIN DEGRADATION QUANT: CPT

## 2022-03-10 PROCEDURE — 36415 COLL VENOUS BLD VENIPUNCTURE: CPT

## 2022-03-10 PROCEDURE — 99285 EMERGENCY DEPT VISIT HI MDM: CPT | Mod: CS

## 2022-03-10 PROCEDURE — 85025 COMPLETE CBC W/AUTO DIFF WBC: CPT

## 2022-03-10 PROCEDURE — 71045 X-RAY EXAM CHEST 1 VIEW: CPT | Mod: 26

## 2022-03-10 PROCEDURE — 80053 COMPREHEN METABOLIC PANEL: CPT

## 2022-03-10 PROCEDURE — 99285 EMERGENCY DEPT VISIT HI MDM: CPT | Mod: 25

## 2022-03-10 PROCEDURE — 71045 X-RAY EXAM CHEST 1 VIEW: CPT

## 2022-03-10 RX ORDER — ALBUTEROL 90 UG/1
2 AEROSOL, METERED ORAL
Qty: 1 | Refills: 0
Start: 2022-03-10 | End: 2022-03-14

## 2022-03-10 RX ORDER — CLARITHROMYCIN 500 MG
1 TABLET ORAL
Qty: 1 | Refills: 0
Start: 2022-03-10 | End: 2022-03-14

## 2022-03-10 RX ORDER — IPRATROPIUM/ALBUTEROL SULFATE 18-103MCG
3 AEROSOL WITH ADAPTER (GRAM) INHALATION ONCE
Refills: 0 | Status: COMPLETED | OUTPATIENT
Start: 2022-03-10 | End: 2022-03-10

## 2022-03-10 RX ORDER — SODIUM CHLORIDE 9 MG/ML
1000 INJECTION INTRAMUSCULAR; INTRAVENOUS; SUBCUTANEOUS ONCE
Refills: 0 | Status: COMPLETED | OUTPATIENT
Start: 2022-03-10 | End: 2022-03-10

## 2022-03-10 RX ADMIN — Medication 3 MILLILITER(S): at 12:23

## 2022-03-10 RX ADMIN — Medication 125 MILLIGRAM(S): at 12:04

## 2022-03-10 RX ADMIN — SODIUM CHLORIDE 1000 MILLILITER(S): 9 INJECTION INTRAMUSCULAR; INTRAVENOUS; SUBCUTANEOUS at 12:04

## 2022-03-10 NOTE — ED PROVIDER NOTE - CLINICAL SUMMARY MEDICAL DECISION MAKING FREE TEXT BOX
wheezing and shortness of breath past 2-3 days. covid 6 weeks ago. differentials include but not limited to bronchitis, pneumonia, RAD, PE. will check labs, CXR, consider CTA if d-dimer elevated. IVF, solumedrol, albuterol

## 2022-03-10 NOTE — ED PROVIDER NOTE - NSFOLLOWUPINSTRUCTIONS_ED_ALL_ED_FT
drink plenty of fluids  stop smoking   z-pack next 5 days as prescribed  prednisone daily next 5 days  albuterol every 4-6 hours  follow up with primary care provider       Acute Bronchitis    WHAT YOU NEED TO KNOW:    Acute bronchitis is swelling and irritation in your lungs. It is usually caused by a virus and most often happens in the winter. Bronchitis may also be caused by bacteria or by a chemical irritant, such as smoke.    DISCHARGE INSTRUCTIONS:    Return to the emergency department if:   •You cough up blood.      •Your lips or fingernails turn blue.      •You feel like you are not getting enough air when you breathe.      Call your doctor if:   •Your symptoms do not go away or get worse, even after treatment.      •Your cough does not get better within 4 weeks.      •You have questions or concerns about your condition or care.      Medicines: You may need any of the following:   •Cough suppressants decrease your urge to cough.       •Decongestants help loosen mucus in your lungs and make it easier to cough up. This can help you breathe easier.      •Inhalers may be given. Your healthcare provider may give you one or more inhalers to help you breathe easier and cough less. An inhaler gives your medicine to open your airways. Ask your healthcare provider to show you how to use your inhaler correctly.  Metered Dose Inhaler           •Antibiotics may be given for up to 5 days if your bronchitis is caused by bacteria.      •Acetaminophen decreases pain and fever. It is available without a doctor's order. Ask how much to take and how often to take it. Follow directions. Read the labels of all other medicines you are using to see if they also contain acetaminophen, or ask your doctor or pharmacist. Acetaminophen can cause liver damage if not taken correctly. Do not use more than 4 grams (4,000 milligrams) total of acetaminophen in one day.       •NSAIDs help decrease swelling and pain or fever. This medicine is available with or without a doctor's order. NSAIDs can cause stomach bleeding or kidney problems in certain people. If you take blood thinner medicine, always ask your healthcare provider if NSAIDs are safe for you. Always read the medicine label and follow directions.      •Take your medicine as directed. Contact your healthcare provider if you think your medicine is not helping or if you have side effects. Tell him of her if you are allergic to any medicine. Keep a list of the medicines, vitamins, and herbs you take. Include the amounts, and when and why you take them. Bring the list or the pill bottles to follow-up visits. Carry your medicine list with you in case of an emergency.      Self-care:   •Drink liquids as directed. You may need to drink more liquids than usual to stay hydrated. Ask how much liquid to drink each day and which liquids are best for you.      •Use a cool mist humidifier to increase air moisture in your home. This may make it easier for you to breathe and help decrease your cough.       •Get more rest. Rest helps your body to heal. Slowly start to do more each day. Rest when you feel it is needed.      •Avoid irritants in the air. Avoid chemicals, fumes, and dust. Wear a face mask if you must work around dust or fumes. Stay inside on days when air pollution levels are high. If you have allergies, stay inside when pollen counts are high. Do not use aerosol products, such as spray-on deodorant, bug spray, and hair spray.      •Do not smoke or be around others who are smoking. Nicotine and other chemicals in cigarettes and cigars can cause lung damage. Ask your healthcare provider for information if you currently smoke and need help to quit. E-cigarettes or smokeless tobacco still contain nicotine. Talk to your healthcare provider before you use these products.       Prevent acute bronchitis:          •Ask about vaccines you may need. Get a flu vaccine each year as soon as recommended, usually in September or October. Ask your healthcare provider if you should also get a pneumonia or COVID-19 vaccine. Your healthcare provider can tell you if you should also get other vaccines, and when to get them.      •Prevent the spread of germs. You can decrease your risk for acute bronchitis and other illnesses by doing the following: ?Wash your hands often with soap and water. Carry germ-killing hand lotion or gel with you. You can use the lotion or gel to clean your hands when soap and water are not available.  Handwashing           ?Do not touch your eyes, nose, or mouth unless you have washed your hands first.      ?Always cover your mouth when you cough to prevent the spread of germs. It is best to cough into a tissue or your shirt sleeve instead of into your hand. Ask those around you to cover their mouths when they cough.      ?Try to avoid people who have a cold or the flu. If you are sick, stay away from others as much as possible.        Follow up with your doctor as directed: Write down questions you have so you will remember to ask them during your follow-up visits.

## 2022-03-10 NOTE — ED ADULT NURSE NOTE - NSIMPLEMENTINTERV_GEN_ALL_ED
Implemented All Fall with Harm Risk Interventions:  [place] to call system. Call bell, personal items and telephone within reach. Instruct patient to call for assistance. Room bathroom lighting operational. Non-slip footwear when patient is off stretcher. Physically safe environment: no spills, clutter or unnecessary equipment. Stretcher in lowest position, wheels locked, appropriate side rails in place. Provide visual cue, wrist band, yellow gown, etc. Monitor gait and stability. Monitor for mental status changes and reorient to person, place, and time. Review medications for side effects contributing to fall risk. Reinforce activity limits and safety measures with patient and family. Provide visual clues: red socks.

## 2022-03-10 NOTE — ED PROVIDER NOTE - PROGRESS NOTE DETAILS
Kamar LINN for ED attending, Dr. Raphael; 72 y/o M presents to ED c/o wheezing and shortness of breath past 2-3 days. Pt reports being diagnosed with covid 5 weeks ago but continues to have mild cough. However, shortness of breath just started 2-3 days ago. No chest pain, fevers, n/v/d, abd pain. Pt has been using albuterol inhaler w/ mild improvement of symptoms. No recent travel, leg pain or swelling. No history of DVT or PE. On exam: vital signs normal, afebrile and in no distress, o2 stat 100 on room air, heart sounds s1 s2 RRR no mgr, lungs scatter expiratory wheeze, abd soft, extremities no edema. Impression is COPD exacerbation r/o PNA recent COVID. Plan CXR, labs and duoneb treatment. Reevaluated patient at bedside.  Patient feeling improved. importance of smoking cessation discussed.  Discussed the results of all diagnostic testing in ED and copies of all reports given.   An opportunity to ask questions was given.  Discussed the importance of prompt, close medical follow-up.  Patient will return with any changes, concerns or persistent / worsening symptoms.  Understanding of all instructions verbalized.

## 2022-03-10 NOTE — ED PROVIDER NOTE - CARE PROVIDER_API CALL
Jaspal Bermeo (DO)  Medicine  [facility]  [address], [address]  [place] [address]  Phone: [phone]  Fax: [phone]  Follow Up Time: 1-3 Days

## 2022-03-10 NOTE — ED ADULT TRIAGE NOTE - CHIEF COMPLAINT QUOTE
short of breathe had covid 1 month ago and no resp issues only cough.  denies fever or chills. diffuse insp and exp wheezing

## 2022-03-10 NOTE — ED PROVIDER NOTE - CARE PLAN
1 Principal Discharge DX:	Acute bronchitis due to COVID-19 virus  Secondary Diagnosis:	Shortness of breath

## 2022-03-10 NOTE — ED PROVIDER NOTE - PATIENT PORTAL LINK FT
You can access the FollowMyHealth Patient Portal offered by [facility] by registering at the following website: [email or web address]. By joining [facility]’s FollowMyHealth portal, you will also be able to view your health information using other applications (apps) compatible with our system.

## 2022-03-10 NOTE — ED PROVIDER NOTE - OBJECTIVE STATEMENT
otherwise healthy 71 year old male presents with wheezing and shortness of breath past 2-3 days. no chest pain. reports being diagnosed with covid 5 weeks ago (only had cough). continues to have mild cough, but shortness of breath just started 2-3 days ago. no fevers. no n/v/d. no abd pain. has been using albuterol inhaler with mild improvement of symptoms. no recent travels. no leg pain or swelling. no history of DVT or PE   PCP Jaspal Bermeo

## 2022-03-10 NOTE — ED ADULT NURSE NOTE - OBJECTIVE STATEMENT
70 y/o male received aox4 ambulatory c/o shortness of breath x1 week, pt states he uses albuterol pump at home with some relief. cough noted with white sputum. placed on cardiac monitor, O2 sat note 98% on room air.

## 2022-03-11 ENCOUNTER — NON-APPOINTMENT (OUTPATIENT)
Age: 72
End: 2022-03-11

## 2022-06-13 ENCOUNTER — APPOINTMENT (OUTPATIENT)
Dept: PULMONOLOGY | Facility: CLINIC | Age: 72
End: 2022-06-13
Payer: MEDICARE

## 2022-06-13 VITALS
DIASTOLIC BLOOD PRESSURE: 59 MMHG | WEIGHT: 190 LBS | SYSTOLIC BLOOD PRESSURE: 121 MMHG | HEIGHT: 70 IN | HEART RATE: 72 BPM | BODY MASS INDEX: 27.2 KG/M2 | OXYGEN SATURATION: 97 %

## 2022-06-13 PROCEDURE — 94010 BREATHING CAPACITY TEST: CPT

## 2022-06-13 PROCEDURE — 94729 DIFFUSING CAPACITY: CPT

## 2022-06-13 PROCEDURE — ZZZZZ: CPT

## 2022-06-13 PROCEDURE — 99204 OFFICE O/P NEW MOD 45 MIN: CPT | Mod: 25

## 2022-06-13 PROCEDURE — 94726 PLETHYSMOGRAPHY LUNG VOLUMES: CPT

## 2022-06-13 NOTE — HISTORY OF PRESENT ILLNESS
[Current] : current [TextBox_4] : 71M active smoker for 60 years\par \par hospitalized at least twice in past 1-2 years\par most recently 1 mo ago, given nebs/abx/albuterol \par currently feels fine\par denies sob, cough, sputum, chest pain\par not using any inhalers at the moment\par  [TextBox_11] : 0.5 [TextBox_13] : 59

## 2022-06-13 NOTE — ASSESSMENT
[FreeTextEntry1] : must stop smoking\par start anoro once daily\par lung ca ct screen now\par fu 1 mo

## 2022-06-13 NOTE — CONSULT LETTER
[FreeTextEntry1] : Dear ,\par \par I had the pleasure of evaluating your patient, PABLO DREW today in pulmonary consultation.  Please refer to my attached note for my findings and recommendations.\par \par \par Thank you for allowing me to participate in the care of your patient, please feel free to call with any questions or concerns.\par \par \par Sincerely,\par \par Minerva Nieves MD\par [facility] \par [facility]\par \par

## 2022-08-28 ENCOUNTER — RESULT CHARGE (OUTPATIENT)
Age: 72
End: 2022-08-28

## 2022-08-30 ENCOUNTER — NON-APPOINTMENT (OUTPATIENT)
Age: 72
End: 2022-08-30

## 2022-08-30 ENCOUNTER — APPOINTMENT (OUTPATIENT)
Dept: INTERNAL MEDICINE | Facility: CLINIC | Age: 72
End: 2022-08-30

## 2022-08-30 VITALS
WEIGHT: 195 LBS | SYSTOLIC BLOOD PRESSURE: 112 MMHG | DIASTOLIC BLOOD PRESSURE: 70 MMHG | TEMPERATURE: 98.4 F | HEART RATE: 65 BPM | RESPIRATION RATE: 14 BRPM | HEIGHT: 70 IN | OXYGEN SATURATION: 100 % | BODY MASS INDEX: 27.92 KG/M2

## 2022-08-30 DIAGNOSIS — D69.6 THROMBOCYTOPENIA, UNSPECIFIED: ICD-10-CM

## 2022-08-30 DIAGNOSIS — R73.9 HYPERGLYCEMIA, UNSPECIFIED: ICD-10-CM

## 2022-08-30 PROCEDURE — 36415 COLL VENOUS BLD VENIPUNCTURE: CPT

## 2022-08-30 PROCEDURE — 93000 ELECTROCARDIOGRAM COMPLETE: CPT | Mod: 59

## 2022-08-30 PROCEDURE — G0444 DEPRESSION SCREEN ANNUAL: CPT | Mod: 59

## 2022-08-30 PROCEDURE — G0439: CPT

## 2022-08-30 PROCEDURE — 99213 OFFICE O/P EST LOW 20 MIN: CPT | Mod: 25

## 2022-08-30 NOTE — PLAN
[FreeTextEntry1] : Pulmonary\par COPD - continue Anoro Ellipta 62.5-25 MCG/INH as directed; continue Albuterol Sulfate HFA MCG/ACT as directed \par current every-day smoker - check EKG (results as above) - again discussed the importance of smoking cessation in reducing risk for CV disease/lung cancer; previously discussed starting Chantix, patient to consider and follow up when ready/willing to quit smoking; Rx given for LDCT \par Continue to follow up with pulmonologist, Dr. Nieves\par Endocrinology\par hyperglycemia - continue low carbohydrate/low sugar diet; previously recommended increasing CV exercise - check hemoglobin A1C\par Hematology\par thrombocytopenia - check CBC\par Gastroenterology\par Results of last screening colonoscopy to be requested from gastroenterologist, Dr. Parmar's office\par Immunization\par flu vaccine - discussed, declined\par Pneumovax 23/Prevnar - again recommended given age, declined \par Tdap (Adacel) Vaccine - again discussed, declined\par Shingrix - again recommended given age, declined \par \par check EKG (results as above), male panel, hemoglobin A1C, and UA w/ Reflex Urine Culture

## 2022-08-30 NOTE — ADDENDUM
[FreeTextEntry1] : I, Blayne Pedroza, acted solely as scribe for Dr. Jaspal Bermeo DO on this date 08/30/2022 12:30PM .\par \par All medical record entries made by the Scribe were at my, Dr. Jaspal Bermeo DO direction and personally dictated by me on 08/30/2022 12:30PM. I have reviewed the chart and agree that the record accurately reflects my personal performance of the history, physical exam, assessment and plan. I have also personally directed, reviewed and agreed with the chart.

## 2022-08-30 NOTE — HEALTH RISK ASSESSMENT
[Current] : Current [No] : In the past 12 months have you used drugs other than those required for medical reasons? No [0] : 2) Feeling down, depressed, or hopeless: Not at all (0) [PHQ-2 Negative - No further assessment needed] : PHQ-2 Negative - No further assessment needed [Fully functional (bathing, dressing, toileting, transferring, walking, feeding)] : Fully functional (bathing, dressing, toileting, transferring, walking, feeding) [Fully functional (using the telephone, shopping, preparing meals, housekeeping, doing laundry, using] : Fully functional and needs no help or supervision to perform IADLs (using the telephone, shopping, preparing meals, housekeeping, doing laundry, using transportation, managing medications and managing finances) [de-identified] : Daily or almost daily.  [Audit-CScore] : 0 [de-identified] : Walking, golf.  [[id number]] : 0 [ColonoscopyComments] : Results of last screening to be requested from Dr. Parmar's office.

## 2022-08-30 NOTE — PHYSICAL EXAM
[No Acute Distress] : no acute distress [Well Nourished] : well nourished [Well Developed] : well developed [Well-Appearing] : well-appearing [Normal Sclera/Conjunctiva] : normal sclera/conjunctiva [PERRL] : pupils equal round and reactive to light [EOMI] : extraocular movements intact [Normal Outer Ear/Nose] : the outer ears and nose were normal in appearance [Normal Oropharynx] : the oropharynx was normal [No JVD] : no jugular venous distention [No Lymphadenopathy] : no lymphadenopathy [Supple] : supple [Thyroid Normal, No Nodules] : the thyroid was normal and there were no nodules present [No Respiratory Distress] : no respiratory distress  [No Accessory Muscle Use] : no accessory muscle use [Clear to Auscultation] : lungs were clear to auscultation bilaterally [Normal Rate] : normal rate  [Regular Rhythm] : with a regular rhythm [Normal S1, S2] : normal S1 and S2 [No Murmur] : no murmur heard [No Carotid Bruits] : no carotid bruits [No Abdominal Bruit] : a ~M bruit was not heard ~T in the abdomen [No Varicosities] : no varicosities [Pedal Pulses Present] : the pedal pulses are present [No Edema] : there was no peripheral edema [No Palpable Aorta] : no palpable aorta [No Extremity Clubbing/Cyanosis] : no extremity clubbing/cyanosis [Soft] : abdomen soft [Non Tender] : non-tender [Non-distended] : non-distended [No Masses] : no abdominal mass palpated [No HSM] : no HSM [Normal Bowel Sounds] : normal bowel sounds [Normal Posterior Cervical Nodes] : no posterior cervical lymphadenopathy [Normal Anterior Cervical Nodes] : no anterior cervical lymphadenopathy [No CVA Tenderness] : no CVA  tenderness [No Spinal Tenderness] : no spinal tenderness [No Joint Swelling] : no joint swelling [Grossly Normal Strength/Tone] : grossly normal strength/tone [No Rash] : no rash [Coordination Grossly Intact] : coordination grossly intact [No Focal Deficits] : no focal deficits [Normal Gait] : normal gait [Deep Tendon Reflexes (DTR)] : deep tendon reflexes were 2+ and symmetric [Normal Affect] : the affect was normal [Normal Insight/Judgement] : insight and judgment were intact [Normal Voice/Communication] : normal voice/communication [Normal TMs] : both tympanic membranes were normal [Normal Nasal Mucosa] : the nasal mucosa was normal [Declined Rectal Exam] : declined rectal exam [Normal Supraclavicular Nodes] : no supraclavicular lymphadenopathy [Kyphosis] : no kyphosis [Scoliosis] : no scoliosis [Acne] : no acne [Speech Grossly Normal] : speech grossly normal [Memory Grossly Normal] : memory grossly normal [Alert and Oriented x3] : oriented to person, place, and time [Normal Mood] : the mood was normal [de-identified] : overweight

## 2022-08-30 NOTE — ASSESSMENT
[FreeTextEntry1] : Patient is a 71 year old male with a past medical history as above who presents for an annual wellness visit.

## 2022-08-30 NOTE — HISTORY OF PRESENT ILLNESS
[FreeTextEntry1] : annual wellness visit [de-identified] : Patient is a 71 year old male with a past medical history as below who presents for an annual wellness visit. Patient's last screening colonoscopy was 2 years ago with gastroenterologist, Dr. Parmar. Patient does not receive the flu vaccine annually. He has not received either Pneumonia Vaccine. He has not received the Tetanus Vaccine in the past 10 years. He has not received the Shingles (Shingrix) Vaccine. He is vaccinated against COVID-19 (Pfizer, 3 doses).\par \par Patient is a current smoker (6-7 cigarettes per day). Pulmonologist, Dr. Nieves started the patient on Anoro Ellipta for COPD. LDCT was previously recommended. \par \par Patient notes upcoming trip to [place].

## 2022-09-04 ENCOUNTER — NON-APPOINTMENT (OUTPATIENT)
Age: 72
End: 2022-09-04

## 2022-09-04 LAB
25(OH)D3 SERPL-MCNC: 34.8 NG/ML
ALBUMIN SERPL ELPH-MCNC: 5.1 G/DL
ALP BLD-CCNC: 62 U/L
ALT SERPL-CCNC: 17 U/L
ANION GAP SERPL CALC-SCNC: 13 MMOL/L
APPEARANCE: CLEAR
AST SERPL-CCNC: 18 U/L
BASOPHILS # BLD AUTO: 0.04 K/UL
BASOPHILS NFR BLD AUTO: 0.7 %
BILIRUB SERPL-MCNC: 0.5 MG/DL
BILIRUBIN URINE: NEGATIVE
BLOOD URINE: NEGATIVE
BUN SERPL-MCNC: 13 MG/DL
CALCIUM SERPL-MCNC: 9.6 MG/DL
CHLORIDE SERPL-SCNC: 102 MMOL/L
CHOLEST SERPL-MCNC: 128 MG/DL
CO2 SERPL-SCNC: 23 MMOL/L
COLOR: NORMAL
CREAT SERPL-MCNC: 1.05 MG/DL
EGFR: 75 ML/MIN/1.73M2
EOSINOPHIL # BLD AUTO: 0.4 K/UL
EOSINOPHIL NFR BLD AUTO: 6.9 %
ESTIMATED AVERAGE GLUCOSE: 123 MG/DL
GLUCOSE QUALITATIVE U: NEGATIVE
GLUCOSE SERPL-MCNC: 87 MG/DL
HBA1C MFR BLD HPLC: 5.9 %
HCT VFR BLD CALC: 51 %
HDLC SERPL-MCNC: 37 MG/DL
HGB BLD-MCNC: 16.5 G/DL
IMM GRANULOCYTES NFR BLD AUTO: 0.2 %
KETONES URINE: NEGATIVE
LDLC SERPL CALC-MCNC: 72 MG/DL
LEUKOCYTE ESTERASE URINE: NEGATIVE
LYMPHOCYTES # BLD AUTO: 1.29 K/UL
LYMPHOCYTES NFR BLD AUTO: 22.2 %
MAN DIFF?: NORMAL
MCHC RBC-ENTMCNC: 29.9 PG
MCHC RBC-ENTMCNC: 32.4 GM/DL
MCV RBC AUTO: 92.4 FL
MONOCYTES # BLD AUTO: 0.44 K/UL
MONOCYTES NFR BLD AUTO: 7.6 %
NEUTROPHILS # BLD AUTO: 3.63 K/UL
NEUTROPHILS NFR BLD AUTO: 62.4 %
NITRITE URINE: NEGATIVE
NONHDLC SERPL-MCNC: 91 MG/DL
PH URINE: 6
PLATELET # BLD AUTO: 113 K/UL
POTASSIUM SERPL-SCNC: 5 MMOL/L
PROT SERPL-MCNC: 6.9 G/DL
PROTEIN URINE: NEGATIVE
PSA SERPL-MCNC: 3.36 NG/ML
RBC # BLD: 5.52 M/UL
RBC # FLD: 13.3 %
SODIUM SERPL-SCNC: 138 MMOL/L
SPECIFIC GRAVITY URINE: 1.01
TRIGL SERPL-MCNC: 94 MG/DL
TSH SERPL-ACNC: 2.41 UIU/ML
UROBILINOGEN URINE: NORMAL
WBC # FLD AUTO: 5.81 K/UL

## 2022-09-21 ENCOUNTER — RX RENEWAL (OUTPATIENT)
Age: 72
End: 2022-09-21

## 2022-11-09 RX ORDER — ALBUTEROL SULFATE 1.25 MG/3ML
1.25 SOLUTION RESPIRATORY (INHALATION)
Qty: 2 | Refills: 1 | Status: ACTIVE | COMMUNITY
Start: 2022-11-07 | End: 1900-01-01

## 2022-11-10 ENCOUNTER — INPATIENT (INPATIENT)
Facility: HOSPITAL | Age: 72
LOS: 0 days | Discharge: HOME | DRG: 192 | End: 2022-11-11
Attending: INTERNAL MEDICINE | Admitting: INTERNAL MEDICINE
Payer: COMMERCIAL

## 2022-11-10 VITALS
HEIGHT: 70 IN | DIASTOLIC BLOOD PRESSURE: 80 MMHG | OXYGEN SATURATION: 95 % | SYSTOLIC BLOOD PRESSURE: 149 MMHG | WEIGHT: 188.72 LBS | HEART RATE: 78 BPM | RESPIRATION RATE: 22 BRPM

## 2022-11-10 DIAGNOSIS — F41.9 ANXIETY DISORDER, UNSPECIFIED: ICD-10-CM

## 2022-11-10 DIAGNOSIS — J44.1 CHRONIC OBSTRUCTIVE PULMONARY DISEASE WITH (ACUTE) EXACERBATION: ICD-10-CM

## 2022-11-10 DIAGNOSIS — F17.200 NICOTINE DEPENDENCE, UNSPECIFIED, UNCOMPLICATED: ICD-10-CM

## 2022-11-10 DIAGNOSIS — Z29.9 ENCOUNTER FOR PROPHYLACTIC MEASURES, UNSPECIFIED: ICD-10-CM

## 2022-11-10 LAB
ALBUMIN SERPL ELPH-MCNC: 4.3 G/DL — SIGNIFICANT CHANGE UP (ref 3.3–5)
ALP SERPL-CCNC: 70 U/L — SIGNIFICANT CHANGE UP (ref 30–120)
ALT FLD-CCNC: 18 U/L DA — SIGNIFICANT CHANGE UP (ref 10–60)
ANION GAP SERPL CALC-SCNC: 8 MMOL/L — SIGNIFICANT CHANGE UP (ref 5–17)
APTT BLD: 33.4 SEC — SIGNIFICANT CHANGE UP (ref 27.5–35.5)
AST SERPL-CCNC: 17 U/L — SIGNIFICANT CHANGE UP (ref 10–40)
BASOPHILS # BLD AUTO: 0.03 K/UL — SIGNIFICANT CHANGE UP (ref 0–0.2)
BASOPHILS NFR BLD AUTO: 0.5 % — SIGNIFICANT CHANGE UP (ref 0–2)
BILIRUB SERPL-MCNC: 0.5 MG/DL — SIGNIFICANT CHANGE UP (ref 0.2–1.2)
BUN SERPL-MCNC: 15 MG/DL — SIGNIFICANT CHANGE UP (ref 7–23)
CALCIUM SERPL-MCNC: 9.2 MG/DL — SIGNIFICANT CHANGE UP (ref 8.4–10.5)
CHLORIDE SERPL-SCNC: 106 MMOL/L — SIGNIFICANT CHANGE UP (ref 96–108)
CO2 SERPL-SCNC: 28 MMOL/L — SIGNIFICANT CHANGE UP (ref 22–31)
CREAT SERPL-MCNC: 1.22 MG/DL — SIGNIFICANT CHANGE UP (ref 0.5–1.3)
CRP SERPL-MCNC: <3 MG/L — SIGNIFICANT CHANGE UP
EGFR: 63 ML/MIN/1.73M2 — SIGNIFICANT CHANGE UP
EOSINOPHIL # BLD AUTO: 0.8 K/UL — HIGH (ref 0–0.5)
EOSINOPHIL NFR BLD AUTO: 12.5 % — HIGH (ref 0–6)
GLUCOSE SERPL-MCNC: 108 MG/DL — HIGH (ref 70–99)
HCT VFR BLD CALC: 49.1 % — SIGNIFICANT CHANGE UP (ref 39–50)
HGB BLD-MCNC: 16.2 G/DL — SIGNIFICANT CHANGE UP (ref 13–17)
IMM GRANULOCYTES NFR BLD AUTO: 0.3 % — SIGNIFICANT CHANGE UP (ref 0–0.9)
INR BLD: 1.18 RATIO — HIGH (ref 0.88–1.16)
LYMPHOCYTES # BLD AUTO: 1.43 K/UL — SIGNIFICANT CHANGE UP (ref 1–3.3)
LYMPHOCYTES # BLD AUTO: 22.3 % — SIGNIFICANT CHANGE UP (ref 13–44)
MCHC RBC-ENTMCNC: 29.5 PG — SIGNIFICANT CHANGE UP (ref 27–34)
MCHC RBC-ENTMCNC: 33 GM/DL — SIGNIFICANT CHANGE UP (ref 32–36)
MCV RBC AUTO: 89.3 FL — SIGNIFICANT CHANGE UP (ref 80–100)
MONOCYTES # BLD AUTO: 0.53 K/UL — SIGNIFICANT CHANGE UP (ref 0–0.9)
MONOCYTES NFR BLD AUTO: 8.3 % — SIGNIFICANT CHANGE UP (ref 2–14)
NEUTROPHILS # BLD AUTO: 3.61 K/UL — SIGNIFICANT CHANGE UP (ref 1.8–7.4)
NEUTROPHILS NFR BLD AUTO: 56.1 % — SIGNIFICANT CHANGE UP (ref 43–77)
NRBC # BLD: 0 /100 WBCS — SIGNIFICANT CHANGE UP (ref 0–0)
NT-PROBNP SERPL-SCNC: 72 PG/ML — SIGNIFICANT CHANGE UP (ref 0–125)
PLATELET # BLD AUTO: 115 K/UL — LOW (ref 150–400)
POTASSIUM SERPL-MCNC: 4.8 MMOL/L — SIGNIFICANT CHANGE UP (ref 3.5–5.3)
POTASSIUM SERPL-SCNC: 4.8 MMOL/L — SIGNIFICANT CHANGE UP (ref 3.5–5.3)
PROT SERPL-MCNC: 7.7 G/DL — SIGNIFICANT CHANGE UP (ref 6–8.3)
PROTHROM AB SERPL-ACNC: 13.9 SEC — HIGH (ref 10.5–13.4)
RAPID RVP RESULT: SIGNIFICANT CHANGE UP
RBC # BLD: 5.5 M/UL — SIGNIFICANT CHANGE UP (ref 4.2–5.8)
RBC # FLD: 12.8 % — SIGNIFICANT CHANGE UP (ref 10.3–14.5)
SARS-COV-2 RNA SPEC QL NAA+PROBE: SIGNIFICANT CHANGE UP
SODIUM SERPL-SCNC: 142 MMOL/L — SIGNIFICANT CHANGE UP (ref 135–145)
TROPONIN I, HIGH SENSITIVITY RESULT: 8.8 NG/L — SIGNIFICANT CHANGE UP
TSH SERPL-MCNC: 2.95 UIU/ML — SIGNIFICANT CHANGE UP (ref 0.27–4.2)
WBC # BLD: 6.42 K/UL — SIGNIFICANT CHANGE UP (ref 3.8–10.5)
WBC # FLD AUTO: 6.42 K/UL — SIGNIFICANT CHANGE UP (ref 3.8–10.5)

## 2022-11-10 PROCEDURE — 99285 EMERGENCY DEPT VISIT HI MDM: CPT

## 2022-11-10 PROCEDURE — 71045 X-RAY EXAM CHEST 1 VIEW: CPT | Mod: 26

## 2022-11-10 PROCEDURE — 93010 ELECTROCARDIOGRAM REPORT: CPT

## 2022-11-10 PROCEDURE — 71250 CT THORAX DX C-: CPT | Mod: 26

## 2022-11-10 PROCEDURE — 99222 1ST HOSP IP/OBS MODERATE 55: CPT | Mod: AI

## 2022-11-10 RX ORDER — ALBUTEROL 90 UG/1
2.5 AEROSOL, METERED ORAL
Refills: 0 | Status: DISCONTINUED | OUTPATIENT
Start: 2022-11-10 | End: 2022-11-11

## 2022-11-10 RX ORDER — IPRATROPIUM/ALBUTEROL SULFATE 18-103MCG
3 AEROSOL WITH ADAPTER (GRAM) INHALATION ONCE
Refills: 0 | Status: COMPLETED | OUTPATIENT
Start: 2022-11-10 | End: 2022-11-10

## 2022-11-10 RX ORDER — ACETAMINOPHEN 500 MG
650 TABLET ORAL EVERY 6 HOURS
Refills: 0 | Status: DISCONTINUED | OUTPATIENT
Start: 2022-11-10 | End: 2022-11-11

## 2022-11-10 RX ORDER — BUDESONIDE AND FORMOTEROL FUMARATE DIHYDRATE 160; 4.5 UG/1; UG/1
2 AEROSOL RESPIRATORY (INHALATION)
Refills: 0 | Status: DISCONTINUED | OUTPATIENT
Start: 2022-11-10 | End: 2022-11-11

## 2022-11-10 RX ORDER — NICOTINE POLACRILEX 2 MG
1 GUM BUCCAL DAILY
Refills: 0 | Status: DISCONTINUED | OUTPATIENT
Start: 2022-11-10 | End: 2022-11-11

## 2022-11-10 RX ORDER — ALPRAZOLAM 0.25 MG
0.5 TABLET ORAL EVERY 8 HOURS
Refills: 0 | Status: DISCONTINUED | OUTPATIENT
Start: 2022-11-10 | End: 2022-11-11

## 2022-11-10 RX ORDER — TIOTROPIUM BROMIDE 18 UG/1
1 CAPSULE ORAL; RESPIRATORY (INHALATION) DAILY
Refills: 0 | Status: DISCONTINUED | OUTPATIENT
Start: 2022-11-10 | End: 2022-11-11

## 2022-11-10 RX ORDER — ENOXAPARIN SODIUM 100 MG/ML
40 INJECTION SUBCUTANEOUS EVERY 24 HOURS
Refills: 0 | Status: DISCONTINUED | OUTPATIENT
Start: 2022-11-10 | End: 2022-11-11

## 2022-11-10 RX ADMIN — Medication 1 PATCH: at 15:11

## 2022-11-10 RX ADMIN — Medication 50 MILLIGRAM(S): at 15:12

## 2022-11-10 RX ADMIN — Medication 3 MILLILITER(S): at 08:19

## 2022-11-10 RX ADMIN — Medication 100 MILLIGRAM(S): at 15:12

## 2022-11-10 RX ADMIN — Medication 3 MILLILITER(S): at 08:12

## 2022-11-10 RX ADMIN — ENOXAPARIN SODIUM 40 MILLIGRAM(S): 100 INJECTION SUBCUTANEOUS at 15:12

## 2022-11-10 RX ADMIN — Medication 3 MILLILITER(S): at 08:28

## 2022-11-10 RX ADMIN — BUDESONIDE AND FORMOTEROL FUMARATE DIHYDRATE 2 PUFF(S): 160; 4.5 AEROSOL RESPIRATORY (INHALATION) at 12:20

## 2022-11-10 RX ADMIN — Medication 100 MILLIGRAM(S): at 21:43

## 2022-11-10 RX ADMIN — TIOTROPIUM BROMIDE 1 CAPSULE(S): 18 CAPSULE ORAL; RESPIRATORY (INHALATION) at 22:22

## 2022-11-10 RX ADMIN — BUDESONIDE AND FORMOTEROL FUMARATE DIHYDRATE 2 PUFF(S): 160; 4.5 AEROSOL RESPIRATORY (INHALATION) at 20:03

## 2022-11-10 RX ADMIN — Medication 1 PATCH: at 19:00

## 2022-11-10 RX ADMIN — Medication 125 MILLIGRAM(S): at 08:15

## 2022-11-10 RX ADMIN — Medication 200 MILLIGRAM(S): at 11:02

## 2022-11-10 NOTE — H&P ADULT - NSHPSOCIALHISTORY_GEN_ALL_CORE
lives with wife  + tobacco history  smoking for past 50 yrs, ranging from 0.25 packs per day to 1 pack per day

## 2022-11-10 NOTE — CONSULT NOTE ADULT - SUBJECTIVE AND OBJECTIVE BOX
Date/Time Patient Seen:  		  Referring MD:   Data Reviewed	       Patient is a 72y old  Male who presents with a chief complaint of     Subjective/HPI   · Chief Complaint: The patient is a 72y Male complaining of shortness of breath.  · HPI Objective Statement: Patient complaining of shortness of breath since .  Patient reports cough with occasional phlegm.  Patient denies fevers chills headache dizziness nausea vomiting chest pain abdominal pain edema calf pain travel.  No history of PE or DVT.  PMD Katof  · Presenting Symptoms: COUGH, SHORTNESS OF BREATH, WHEEZING  · Negative Findings: no chest pain, no chills, no edema, no fever, no headache  · Timing: constant  · Duration: day(s)  · Quality: alteration in breathing pattern, productive cough  · Context: unknown  · Recent Exposure To: none known  · Aggravated Factors: none  · Relieving Factors: none    PAST MEDICAL & SURGICAL HISTORY:  No pertinent past medical history    No significant past surgical history          Medication list         MEDICATIONS  (STANDING):  albuterol/ipratropium for Nebulization. 3 milliLiter(s) Nebulizer once    MEDICATIONS  (PRN):         Vitals log        ICU Vital Signs Last 24 Hrs  T(C): --  T(F): --  HR: 78 (10 Nov 2022 07:49) (78 - 78)  BP: 149/80 (10 Nov 2022 07:49) (149/80 - 149/80)  BP(mean): --  ABP: --  ABP(mean): --  RR: 22 (10 Nov 2022 07:49) (22 - 22)  SpO2: 95% (10 Nov 2022 07:49) (95% - 95%)    O2 Parameters below as of 10 Nov 2022 07:49  Patient On (Oxygen Delivery Method): room air      Past Medical History  History of colonic polyps (V12.72) (Z86.010)  History of hemorrhoids (V13.89) (Z87.19)    Surgical History  Denied: History of Surgery    Family History  Family history of  : Mother, Father  Family history of malignant neoplasm of colon (V16.0) (Z80.0) : Mother, Father    Social History  Caffeine use (V49.89) (Z78.9)  Current every day smoker (305.1) (F17.200)  Drinks beer (V49.89) (Z78.9)  Has 7 children    No illicit drug use  Social alcohol use (Z78.9)             Input and Output:  I&O's Detail      Lab Data                  Review of Systems	  sob    Objective     Physical Examination        Pertinent Lab findings & Imaging      Leonard:  NO   Adequate UO     I&O's Detail           Discussed with:     Cultures:	        Radiology      ACC: [id number] EXAM:  XR CHEST PORTABLE URGENT 1V                          PROCEDURE DATE:  03/10/2022          INTERPRETATION:  AP erect chest on March 10, 2022 at 12:31 PM. Patient   has chest pain.    Heart normal for projection.    Lungs remain clear.    Chest similar to May 23, 2021.    IMPRESSION: Negative chest.    --- End of Report ---            ASTRID VILLALBA MD; Attending Radiologist  This document has been electronically signed. Mar 10 2022  1:42PM                         Date/Time Patient Seen:  		  Referring MD:   Data Reviewed	       Patient is a 72y old  Male who presents with a chief complaint of     Subjective/HPI  in bed  seen and examined  vs noted  labs reviewed  imaging reviewed  outpatient records reviewed  ER provider note reviewed  alert  verbal  oriented    3 children  7 grandchildren  works in textile     · Chief Complaint: The patient is a 72y Male complaining of shortness of breath.  · HPI Objective Statement: Patient complaining of shortness of breath since .  Patient reports cough with occasional phlegm.  Patient denies fevers chills headache dizziness nausea vomiting chest pain abdominal pain edema calf pain travel.  No history of PE or DVT.  PMD Katof  · Presenting Symptoms: COUGH, SHORTNESS OF BREATH, WHEEZING  · Negative Findings: no chest pain, no chills, no edema, no fever, no headache  · Timing: constant  · Duration: day(s)  · Quality: alteration in breathing pattern, productive cough  · Context: unknown  · Recent Exposure To: none known  · Aggravated Factors: none  · Relieving Factors: none    PAST MEDICAL & SURGICAL HISTORY:  No pertinent past medical history    No significant past surgical history          Medication list         MEDICATIONS  (STANDING):  albuterol/ipratropium for Nebulization. 3 milliLiter(s) Nebulizer once    MEDICATIONS  (PRN):         Vitals log        ICU Vital Signs Last 24 Hrs  T(C): --  T(F): --  HR: 78 (10 Nov 2022 07:49) (78 - 78)  BP: 149/80 (10 Nov 2022 07:49) (149/80 - 149/80)  BP(mean): --  ABP: --  ABP(mean): --  RR: 22 (10 Nov 2022 07:49) (22 - 22)  SpO2: 95% (10 Nov 2022 07:49) (95% - 95%)    O2 Parameters below as of 10 Nov 2022 07:49  Patient On (Oxygen Delivery Method): room air      Past Medical History  History of colonic polyps (V12.72) (Z86.010)  History of hemorrhoids (V13.89) (Z87.19)    Surgical History  Denied: History of Surgery    Family History  Family history of  : Mother, Father  Family history of malignant neoplasm of colon (V16.0) (Z80.0) : Mother, Father    Social History  Caffeine use (V49.89) (Z78.9)  Current every day smoker (305.1) (F17.200)  Drinks beer (V49.89) (Z78.9)  Has 7 grandchildren    No illicit drug use  Social alcohol use (Z78.9)             Input and Output:  I&O's Detail      Lab Data                  Review of Systems	  sob    Objective     Physical Examination    wheezy  heart s1s2  lung dec BS  head nc  verbal  alert  cn grossly int  moves all extr      Pertinent Lab findings & Imaging      Leonard:  NO   Adequate UO     I&O's Detail           Discussed with:     Cultures:	        Radiology      ACC: [id number] EXAM:  XR CHEST PORTABLE URGENT 1V                          PROCEDURE DATE:  03/10/2022          INTERPRETATION:  AP erect chest on March 10, 2022 at 12:31 PM. Patient   has chest pain.    Heart normal for projection.    Lungs remain clear.    Chest similar to May 23, 2021.    IMPRESSION: Negative chest.    --- End of Report ---            ASTRID VILLALBA MD; Attending Radiologist  This document has been electronically signed. Mar 10 2022  1:42PM

## 2022-11-10 NOTE — H&P ADULT - NSHPREVIEWOFSYSTEMS_GEN_ALL_CORE
CONSTITUTIONAL: No weakness, fevers or chills  EYES/ENT: No visual changes, no throat pain   RESPIRATORY: + sob, + cough, +wheezing  CARDIOVASCULAR: No chest pain or palpitations  GASTROINTESTINAL: No abdominal, nausea, vomiting, or hematemesis; No diarrhea or constipation. No melena or hematochezia.  GENITOURINARY: No dysuria, frequency or hematuria  NEUROLOGICAL: No dizziness, numbness, or weakness  SKIN: No itching, burning, rashes, or lesions   All other review of systems is negative unless indicated above.

## 2022-11-10 NOTE — H&P ADULT - NSHPPHYSICALEXAM_GEN_ALL_CORE
VITAL SIGNS:    Vital Signs Last 24 Hrs  T(C): --  T(F): --  HR: 62 (10 Nov 2022 08:28) (61 - 78)  BP: 149/80 (10 Nov 2022 07:49) (149/80 - 149/80)  BP(mean): --  RR: 22 (10 Nov 2022 07:49) (22 - 22)  SpO2: 100% (10 Nov 2022 08:28) (95% - 100%)    Parameters below as of 10 Nov 2022 08:28  Patient On (Oxygen Delivery Method): pt on neb tx at 5lpm        PHYSICAL EXAM:     GENERAL: no acute distress  HEENT: NC/AT, EOMI, neck supple, MMM  RESPIRATORY: + coarse bs, + wheezing  CARDIOVASCULAR: RRR, no murmurs, gallops, rubs  ABDOMINAL: soft, non-tender, non-distended, positive bowel sounds   EXTREMITIES: no clubbing, cyanosis, or edema  NEUROLOGICAL: alert and oriented x 3, non-focal  SKIN: no rashes or lesions   MUSCULOSKELETAL: no gross joint deformity

## 2022-11-10 NOTE — H&P ADULT - NSICDXFAMILYHX_GEN_ALL_CORE_FT
FAMILY HISTORY:  Father  Still living? Unknown  Family hx of colon cancer, Age at diagnosis: Age Unknown    Mother  Still living? Unknown  Family hx of colon cancer, Age at diagnosis: Age Unknown

## 2022-11-10 NOTE — ED ADULT NURSE NOTE - OBJECTIVE STATEMENT
patient is from home, c/o sob and wheezing x 5 days, patient is current smoker, Patient denies sick contacts/ no fever/chills/cough at this time, denies SOB and no acute distress. IV accessed and tolerating. Labs drawn & sent results pending. will continue to monitor.

## 2022-11-10 NOTE — H&P ADULT - PROBLEM SELECTOR PLAN 1
Pt seen by Pulm/Bernadette  - started on duonebs, solumedrol, spiriva  - will manage cough with robitussin, tessalon perle  - cxr negative for consolidations or suggestion of PNA , will f/u pulm on role of abx, or azithromycin for anti-inflammatory effect Pt seen by Pulm/Bernadette  - started on albuterol nebs, predniosone 50, s/p solumedrol IV, spiriva, symbicort as per pulm recs  - will manage cough with robitussin, tessalon perle  - cxr negative for consolidations or suggestion of PNA , will f/u pulm on role of abx, or azithromycin for anti-inflammatory effect Pt seen by Pulm/Bernadette  - started on albuterol nebs, predniosone 50, s/p solumedrol IV, spiriva, symbicort as per pulm recs  - will manage cough with robitussin, tessalon perle  - cxr negative for consolidations or suggestion of PNA , will f/u pulm on role of abx, or azithromycin for anti-inflammatory effect  - will need outpt Pulm f/u and PFTs

## 2022-11-10 NOTE — H&P ADULT - PROBLEM SELECTOR PLAN 2
lovenox sq, gfr > 60 alprazolam CT chest ordered as pt has not had one yet, and is active smoker with significant histroy  - nicotine patch started as well

## 2022-11-10 NOTE — H&P ADULT - HISTORY OF PRESENT ILLNESS
73 y/o m w/ PMH of COPD p/w sob worsening for 5 days with cough and sputum production.  Patient has had increasing sob, that was worsening became more severe, and prompted him to seek medical attention in the ED.  Pt also had wheezing, but no fevers, or chest pain.  He does not recall any recent sick contacts or new allergens.  Pt's symptoms were relieved by nebulizer treatments and steroids he received in the ED.

## 2022-11-10 NOTE — H&P ADULT - NSHPLABSRESULTS_GEN_ALL_CORE
16.2   6.42  )-----------( 115      ( 10 Nov 2022 08:20 )             49.1     11-10    142  |  106  |  15  ----------------------------<  108<H>  4.8   |  28  |  1.22    Ca    9.2      10 Nov 2022 08:20    TPro  7.7  /  Alb  4.3  /  TBili  0.5  /  DBili  x   /  AST  17  /  ALT  18  /  AlkPhos  70  11-10      CAPILLARY BLOOD GLUCOSE

## 2022-11-10 NOTE — ED PROVIDER NOTE - OBJECTIVE STATEMENT
Patient complaining of shortness of breath since November 5.  Patient reports cough with occasional phlegm.  Patient denies fevers chills headache dizziness nausea vomiting chest pain abdominal pain edema calf pain travel.  No history of PE or DVT.  URBAN Bermeo

## 2022-11-10 NOTE — CONSULT NOTE ADULT - ASSESSMENT
72y Male complaining of shortness of breath.  Patient complaining of shortness of breath 72y Male complaining of shortness of breath.  Patient complaining of shortness of breath    copd  copd ex  smoker - nicotine dep  anxious    outpatient records reviewed - follows with Dr Nieves - [facility] - outpatient - in [place]  smoking cess ed - counseling  COPD ex - Prednisone - Spiriva - Symbicort - Albuterol PRN nebs for sob and or wheezing  robitussin prn  tylenol prn  VBG - TSH - CRP -   Xanax prn for anxiety  monitor VS and Sat  keep sat > 88 pct  Lung Ca screen as outpatient with Low dose CT chest  spoke with pt - wife -   RVP - pending

## 2022-11-10 NOTE — ED PROVIDER NOTE - CLINICAL SUMMARY MEDICAL DECISION MAKING FREE TEXT BOX
Patient complaining of shortness of breath since November 5.  Patient reports cough with occasional phlegm.  Patient denies fevers chills headache dizziness nausea vomiting chest pain abdominal pain edema calf pain travel.  No history of PE or DVT.  Plan EKG chest x-ray labs Solu-Medrol duo nebs differential includes not limited to MI CHF reactive airway disease pneumonia

## 2022-11-11 ENCOUNTER — TRANSCRIPTION ENCOUNTER (OUTPATIENT)
Age: 72
End: 2022-11-11

## 2022-11-11 VITALS
TEMPERATURE: 98 F | DIASTOLIC BLOOD PRESSURE: 76 MMHG | OXYGEN SATURATION: 93 % | SYSTOLIC BLOOD PRESSURE: 122 MMHG | RESPIRATION RATE: 20 BRPM | HEART RATE: 88 BPM

## 2022-11-11 PROBLEM — J44.9 CHRONIC OBSTRUCTIVE PULMONARY DISEASE, UNSPECIFIED: Chronic | Status: ACTIVE | Noted: 2022-11-10

## 2022-11-11 LAB
ANION GAP SERPL CALC-SCNC: 10 MMOL/L — SIGNIFICANT CHANGE UP (ref 5–17)
BUN SERPL-MCNC: 18 MG/DL — SIGNIFICANT CHANGE UP (ref 7–23)
CALCIUM SERPL-MCNC: 9.1 MG/DL — SIGNIFICANT CHANGE UP (ref 8.4–10.5)
CHLORIDE SERPL-SCNC: 104 MMOL/L — SIGNIFICANT CHANGE UP (ref 96–108)
CO2 SERPL-SCNC: 24 MMOL/L — SIGNIFICANT CHANGE UP (ref 22–31)
CREAT SERPL-MCNC: 1.12 MG/DL — SIGNIFICANT CHANGE UP (ref 0.5–1.3)
EGFR: 69 ML/MIN/1.73M2 — SIGNIFICANT CHANGE UP
GLUCOSE SERPL-MCNC: 138 MG/DL — HIGH (ref 70–99)
HCT VFR BLD CALC: 46.3 % — SIGNIFICANT CHANGE UP (ref 39–50)
HCV AB S/CO SERPL IA: 0.16 S/CO — SIGNIFICANT CHANGE UP (ref 0–0.99)
HCV AB SERPL-IMP: SIGNIFICANT CHANGE UP
HGB BLD-MCNC: 15.4 G/DL — SIGNIFICANT CHANGE UP (ref 13–17)
MCHC RBC-ENTMCNC: 29.3 PG — SIGNIFICANT CHANGE UP (ref 27–34)
MCHC RBC-ENTMCNC: 33.3 GM/DL — SIGNIFICANT CHANGE UP (ref 32–36)
MCV RBC AUTO: 88.2 FL — SIGNIFICANT CHANGE UP (ref 80–100)
NRBC # BLD: 0 /100 WBCS — SIGNIFICANT CHANGE UP (ref 0–0)
PLATELET # BLD AUTO: 121 K/UL — LOW (ref 150–400)
POTASSIUM SERPL-MCNC: 4.4 MMOL/L — SIGNIFICANT CHANGE UP (ref 3.5–5.3)
POTASSIUM SERPL-SCNC: 4.4 MMOL/L — SIGNIFICANT CHANGE UP (ref 3.5–5.3)
RBC # BLD: 5.25 M/UL — SIGNIFICANT CHANGE UP (ref 4.2–5.8)
RBC # FLD: 12.8 % — SIGNIFICANT CHANGE UP (ref 10.3–14.5)
SODIUM SERPL-SCNC: 138 MMOL/L — SIGNIFICANT CHANGE UP (ref 135–145)
WBC # BLD: 14.81 K/UL — HIGH (ref 3.8–10.5)
WBC # FLD AUTO: 14.81 K/UL — HIGH (ref 3.8–10.5)

## 2022-11-11 PROCEDURE — 0225U NFCT DS DNA&RNA 21 SARSCOV2: CPT

## 2022-11-11 PROCEDURE — 85610 PROTHROMBIN TIME: CPT

## 2022-11-11 PROCEDURE — 83880 ASSAY OF NATRIURETIC PEPTIDE: CPT

## 2022-11-11 PROCEDURE — 85025 COMPLETE CBC W/AUTO DIFF WBC: CPT

## 2022-11-11 PROCEDURE — 84484 ASSAY OF TROPONIN QUANT: CPT

## 2022-11-11 PROCEDURE — 84443 ASSAY THYROID STIM HORMONE: CPT

## 2022-11-11 PROCEDURE — 99239 HOSP IP/OBS DSCHRG MGMT >30: CPT

## 2022-11-11 PROCEDURE — 71250 CT THORAX DX C-: CPT

## 2022-11-11 PROCEDURE — 93005 ELECTROCARDIOGRAM TRACING: CPT

## 2022-11-11 PROCEDURE — 86803 HEPATITIS C AB TEST: CPT

## 2022-11-11 PROCEDURE — 85730 THROMBOPLASTIN TIME PARTIAL: CPT

## 2022-11-11 PROCEDURE — 85027 COMPLETE CBC AUTOMATED: CPT

## 2022-11-11 PROCEDURE — 80048 BASIC METABOLIC PNL TOTAL CA: CPT

## 2022-11-11 PROCEDURE — 71045 X-RAY EXAM CHEST 1 VIEW: CPT

## 2022-11-11 PROCEDURE — 94640 AIRWAY INHALATION TREATMENT: CPT

## 2022-11-11 PROCEDURE — 80053 COMPREHEN METABOLIC PANEL: CPT

## 2022-11-11 PROCEDURE — 99285 EMERGENCY DEPT VISIT HI MDM: CPT

## 2022-11-11 PROCEDURE — 86140 C-REACTIVE PROTEIN: CPT

## 2022-11-11 PROCEDURE — 96374 THER/PROPH/DIAG INJ IV PUSH: CPT

## 2022-11-11 PROCEDURE — 36415 COLL VENOUS BLD VENIPUNCTURE: CPT

## 2022-11-11 RX ORDER — ALBUTEROL 90 UG/1
1 AEROSOL, METERED ORAL
Qty: 0 | Refills: 0 | DISCHARGE
Start: 2022-11-11

## 2022-11-11 RX ORDER — NICOTINE POLACRILEX 2 MG
1 GUM BUCCAL
Qty: 0 | Refills: 0 | DISCHARGE
Start: 2022-11-11

## 2022-11-11 RX ORDER — BUDESONIDE AND FORMOTEROL FUMARATE DIHYDRATE 160; 4.5 UG/1; UG/1
2 AEROSOL RESPIRATORY (INHALATION)
Qty: 0 | Refills: 0 | DISCHARGE
Start: 2022-11-11

## 2022-11-11 RX ORDER — INFLUENZA VIRUS VACCINE 15; 15; 15; 15 UG/.5ML; UG/.5ML; UG/.5ML; UG/.5ML
0.7 SUSPENSION INTRAMUSCULAR ONCE
Refills: 0 | Status: DISCONTINUED | OUTPATIENT
Start: 2022-11-11 | End: 2022-11-11

## 2022-11-11 RX ORDER — TIOTROPIUM BROMIDE 18 UG/1
1 CAPSULE ORAL; RESPIRATORY (INHALATION)
Qty: 0 | Refills: 0 | DISCHARGE
Start: 2022-11-11

## 2022-11-11 RX ADMIN — ALBUTEROL 2.5 MILLIGRAM(S): 90 AEROSOL, METERED ORAL at 02:19

## 2022-11-11 RX ADMIN — Medication 1 PATCH: at 08:05

## 2022-11-11 RX ADMIN — Medication 100 MILLIGRAM(S): at 06:06

## 2022-11-11 RX ADMIN — BUDESONIDE AND FORMOTEROL FUMARATE DIHYDRATE 2 PUFF(S): 160; 4.5 AEROSOL RESPIRATORY (INHALATION) at 08:08

## 2022-11-11 RX ADMIN — TIOTROPIUM BROMIDE 1 CAPSULE(S): 18 CAPSULE ORAL; RESPIRATORY (INHALATION) at 08:09

## 2022-11-11 RX ADMIN — Medication 50 MILLIGRAM(S): at 06:26

## 2022-11-11 NOTE — DISCHARGE NOTE PROVIDER - HOSPITAL COURSE
HPI:  71 y/o m w/ PMH of COPD p/w sob worsening for 5 days with cough and sputum production.  Patient has had increasing sob, that was worsening became more severe, and prompted him to seek medical attention in the ED.  Pt also had wheezing, but no fevers, or chest pain.  He does not recall any recent sick contacts or new allergens.  Pt's symptoms were relieved by nebulizer treatments and steroids he received in the ED. (10 Nov 2022 10:31)    HOSPITAL COURSE:    Patient presented with shortness of breath and wheezing,     REVIEW OF SYSTEMS:    CONSTITUTIONAL: No weakness, fevers or chills  EYES/ENT: No visual changes, no throat pain   RESPIRATORY: No cough, wheezing, hemoptysis; No shortness of breath  CARDIOVASCULAR: No chest pain or palpitations  GASTROINTESTINAL: No abdominal, nausea, vomiting, or hematemesis; No diarrhea or constipation. No melena or hematochezia.  GENITOURINARY: No dysuria, frequency or hematuria  NEUROLOGICAL: No dizziness, numbness, or weakness  SKIN: No itching, burning, rashes, or lesions   All other review of systems is negative unless indicated above.    VITAL SIGNS:    Vital Signs Last 24 Hrs  T(C): 36.8 (11 Nov 2022 08:08), Max: 36.8 (11 Nov 2022 08:08)  T(F): 98.3 (11 Nov 2022 08:08), Max: 98.3 (11 Nov 2022 08:08)  HR: 88 (11 Nov 2022 08:08) (75 - 88)  BP: 122/76 (11 Nov 2022 08:08) (110/56 - 134/74)  BP(mean): --  RR: 20 (11 Nov 2022 08:08) (18 - 20)  SpO2: 93% (11 Nov 2022 08:08) (90% - 93%)    Parameters below as of 11 Nov 2022 08:08  Patient On (Oxygen Delivery Method): room air        PHYSICAL EXAM:     GENERAL: no acute distress  HEENT: NC/AT, EOMI, neck supple, MMM  RESPIRATORY: LCTAB/L, no rhonchi, rales, or wheezing  CARDIOVASCULAR: RRR, no murmurs, gallops, rubs  ABDOMINAL: soft, non-tender, non-distended, positive bowel sounds   EXTREMITIES: no clubbing, cyanosis, or edema  NEUROLOGICAL: alert and oriented x 3, non-focal  SKIN: no rashes or lesions   MUSCULOSKELETAL: no gross joint deformity                          15.4   14.81 )-----------( 121      ( 11 Nov 2022 06:00 )             46.3     11-11    138  |  104  |  18  ----------------------------<  138<H>  4.4   |  24  |  1.12    Ca    9.1      11 Nov 2022 06:00    TPro  7.7  /  Alb  4.3  /  TBili  0.5  /  DBili  x   /  AST  17  /  ALT  18  /  AlkPhos  70  11-10   HPI:  73 y/o m w/ PMH of COPD p/w sob worsening for 5 days with cough and sputum production.  Patient has had increasing sob, that was worsening became more severe, and prompted him to seek medical attention in the ED.  Pt also had wheezing, but no fevers, or chest pain.  He does not recall any recent sick contacts or new allergens.  Pt's symptoms were relieved by nebulizer treatments and steroids he received in the ED. (10 Nov 2022 10:31)    HOSPITAL COURSE:    Patient presented with shortness of breath and wheezing, was evaluated by pulm, and admitted for treatment with albuterol nebulizers, prednisone, spiriva and symbicort..,  CT chest showed a 10 x 6 x 6 cm nodule and recommended for follow up in 3 months or a PET scan.  Patient's symptoms improved while in the hospital and pulm cleared patient for discharge with plans for follow up with PMD and Pulm in outpatient setting.    REVIEW OF SYSTEMS:    CONSTITUTIONAL: No weakness, fevers or chills  EYES/ENT: No visual changes, no throat pain   RESPIRATORY: No cough, wheezing, hemoptysis; No shortness of breath  CARDIOVASCULAR: No chest pain or palpitations  GASTROINTESTINAL: No abdominal, nausea, vomiting, or hematemesis; No diarrhea or constipation. No melena or hematochezia.  GENITOURINARY: No dysuria, frequency or hematuria  NEUROLOGICAL: No dizziness, numbness, or weakness  SKIN: No itching, burning, rashes, or lesions   All other review of systems is negative unless indicated above.    VITAL SIGNS:    Vital Signs Last 24 Hrs  T(C): 36.8 (11 Nov 2022 08:08), Max: 36.8 (11 Nov 2022 08:08)  T(F): 98.3 (11 Nov 2022 08:08), Max: 98.3 (11 Nov 2022 08:08)  HR: 88 (11 Nov 2022 08:08) (75 - 88)  BP: 122/76 (11 Nov 2022 08:08) (110/56 - 134/74)  BP(mean): --  RR: 20 (11 Nov 2022 08:08) (18 - 20)  SpO2: 93% (11 Nov 2022 08:08) (90% - 93%)    Parameters below as of 11 Nov 2022 08:08  Patient On (Oxygen Delivery Method): room air        PHYSICAL EXAM:     GENERAL: no acute distress  HEENT: NC/AT, EOMI, neck supple, MMM  RESPIRATORY: LCTAB/L, no rhonchi, rales, or wheezing  CARDIOVASCULAR: RRR, no murmurs, gallops, rubs  ABDOMINAL: soft, non-tender, non-distended, positive bowel sounds   EXTREMITIES: no clubbing, cyanosis, or edema  NEUROLOGICAL: alert and oriented x 3, non-focal  SKIN: no rashes or lesions   MUSCULOSKELETAL: no gross joint deformity                          15.4   14.81 )-----------( 121      ( 11 Nov 2022 06:00 )             46.3     11-11    138  |  104  |  18  ----------------------------<  138<H>  4.4   |  24  |  1.12    Ca    9.1      11 Nov 2022 06:00    TPro  7.7  /  Alb  4.3  /  TBili  0.5  /  DBili  x   /  AST  17  /  ALT  18  /  AlkPhos  70  11-10    11/11 -     CT Chest:  Indeterminant left lower lobe pulmonary nodule should be followed with   repeat noncontrast CT scan at 3 months or may be evaluated with PET/CT.

## 2022-11-11 NOTE — DISCHARGE NOTE PROVIDER - NSDCMRMEDTOKEN_GEN_ALL_CORE_FT
guaiFENesin 100 mg/5 mL oral liquid: 10 milliliter(s) orally every 6 hours, As needed, Cough  nicotine 14 mg/24 hr transdermal film, extended release: 1 patch transdermal once a day  predniSONE 50 mg oral tablet: 1 tab(s) orally once a day  tiotropium 18 mcg inhalation capsule: 1 cap(s) inhaled once a day   albuterol 2.5 mg/3 mL (0.083%) inhalation solution: 1 unit(s) inhaled every 6 hours, As Needed for shortness of breath or wheezing  budesonide-formoterol 80 mcg-4.5 mcg/inh inhalation aerosol: 2 puff(s) inhaled 2 times a day  guaiFENesin 100 mg/5 mL oral liquid: 10 milliliter(s) orally every 6 hours, As needed, Cough  nicotine 14 mg/24 hr transdermal film, extended release: 1 patch transdermal once a day  predniSONE 50 mg oral tablet: 1 tab(s) orally once a day  tiotropium 18 mcg inhalation capsule: 1 cap(s) inhaled once a day

## 2022-11-11 NOTE — DISCHARGE NOTE PROVIDER - CARE PROVIDER_API CALL
Jaspal Bermeo (DO)  Medicine  [facility]  [address], [address]  [place] [address]  Phone: [phone]  Fax: [phone]  Follow Up Time: 1 week    Siddhartha Fleming)  Critical Care Medicine; HospicePalliative Medicine; Internal Medicine; Pulmonary Disease  [address] [place] [address]  Phone: [phone]  Fax: [phone]  Follow Up Time: 1 week

## 2022-11-11 NOTE — DISCHARGE NOTE NURSING/CASE MANAGEMENT/SOCIAL WORK - NSDCPEEMAIL_GEN_ALL_CORE
[facility] for Tobacco Control email [email or web address]

## 2022-11-11 NOTE — DISCHARGE NOTE NURSING/CASE MANAGEMENT/SOCIAL WORK - PATIENT PORTAL LINK FT
You can access the FollowMyHealth Patient Portal offered by [facility] by registering at the following website: [email or web address]. By joining [employer]’s FollowMyHealth portal, you will also be able to view your health information using other applications (apps) compatible with our system.

## 2022-11-11 NOTE — DISCHARGE NOTE PROVIDER - CARE PROVIDERS DIRECT ADDRESSES
Patient's 303 hearing held today with CL, Dr Jaylon Byrnes and [facility] over the phone  Patient chose not to attend at her request  Serita Olszewski agreed with doctor's recommendation for extended treatment    Patient's commitment extended up to 20 additional days  [email or web address]

## 2022-11-11 NOTE — DISCHARGE NOTE PROVIDER - PROVIDER TOKENS
PROVIDER:[TOKEN:[[id number]],FOLLOWUP:[1 week]],PROVIDER:[TOKEN:[[id number]],FOLLOWUP:[1 week]]

## 2022-11-11 NOTE — DISCHARGE NOTE NURSING/CASE MANAGEMENT/SOCIAL WORK - NSDCPEFALRISK_GEN_ALL_CORE
For information on Fall & Injury Prevention, visit: [email or web address] OR  [email or web address] OR  https://www.cdc.gov/steadi/patient.html

## 2022-11-11 NOTE — DISCHARGE NOTE PROVIDER - NSDCFUSCHEDAPPT_GEN_ALL_CORE_FT
[facility]  PULMMED [address]  Scheduled Appointment: 11/14/2022

## 2022-11-11 NOTE — DISCHARGE NOTE PROVIDER - NSDCCPCAREPLAN_GEN_ALL_CORE_FT
PRINCIPAL DISCHARGE DIAGNOSIS  Diagnosis: COPD exacerbation  Assessment and Plan of Treatment: follow up with pmd and pulmonology, ont prednisone through 11/14, and spiriva, and symbicort as home edications, may use labuterol nebulizxer for acute shortness of breath      SECONDARY DISCHARGE DIAGNOSES  Diagnosis: Pulmonary nodule  Assessment and Plan of Treatment: nodule seen on CT chest in left lung, please have repeat CT imaging in 3 months and follow up with your primary care doctor.    Diagnosis: Smoking hx  Assessment and Plan of Treatment: Patient is atcive smoker, encouraged to quit, started on nictoine patch

## 2022-11-11 NOTE — PROGRESS NOTE ADULT - SUBJECTIVE AND OBJECTIVE BOX
Date/Time Patient Seen:  		  Referring MD:   Data Reviewed	       Patient is a 72y old  Male who presents with a chief complaint of copd exacerbation (10 Nov 2022 10:31)      Subjective/HPI     PAST MEDICAL & SURGICAL HISTORY:  No pertinent past medical history    COPD without exacerbation    COPD (chronic obstructive pulmonary disease)    No significant past surgical history          Medication list         MEDICATIONS  (STANDING):  benzonatate 100 milliGRAM(s) Oral three times a day  budesonide  80 MICROgram(s)/formoterol 4.5 MICROgram(s) Inhaler 2 Puff(s) Inhalation two times a day  enoxaparin Injectable 40 milliGRAM(s) SubCutaneous every 24 hours  nicotine -  14 mG/24Hr(s) Patch 1 Patch Transdermal daily  predniSONE   Tablet 50 milliGRAM(s) Oral daily  tiotropium 18 MICROgram(s) Capsule 1 Capsule(s) Inhalation daily    MEDICATIONS  (PRN):  acetaminophen     Tablet .. 650 milliGRAM(s) Oral every 6 hours PRN Temp greater or equal to 38C (100.4F), Mild Pain (1 - 3)  albuterol    0.083% 2.5 milliGRAM(s) Nebulizer every 3 hours PRN Shortness of Breath and/or Wheezing  ALPRAZolam 0.5 milliGRAM(s) Oral every 8 hours PRN anxiety  guaiFENesin Oral Liquid (Sugar-Free) 200 milliGRAM(s) Oral every 6 hours PRN Cough         Vitals log        ICU Vital Signs Last 24 Hrs  T(C): 36.4 (11 Nov 2022 06:02), Max: 36.7 (10 Nov 2022 22:18)  T(F): 97.6 (11 Nov 2022 06:02), Max: 98.1 (10 Nov 2022 22:18)  HR: 77 (11 Nov 2022 06:02) (61 - 87)  BP: 110/56 (11 Nov 2022 06:02) (110/56 - 149/80)  BP(mean): --  ABP: --  ABP(mean): --  RR: 18 (11 Nov 2022 06:02) (18 - 22)  SpO2: 93% (11 Nov 2022 06:02) (90% - 100%)    O2 Parameters below as of 11 Nov 2022 06:02  Patient On (Oxygen Delivery Method): room air                 Input and Output:  I&O's Detail      Lab Data                        16.2   6.42  )-----------( 115      ( 10 Nov 2022 08:20 )             49.1     11-10    142  |  106  |  15  ----------------------------<  108<H>  4.8   |  28  |  1.22    Ca    9.2      10 Nov 2022 08:20    TPro  7.7  /  Alb  4.3  /  TBili  0.5  /  DBili  x   /  AST  17  /  ALT  18  /  AlkPhos  70  11-10            Review of Systems	      Objective     Physical Examination    heart s1s2  lung dec BS  head nc      Pertinent Lab findings & Imaging      Aisha:  NO   Adequate UO     I&O's Detail           Discussed with:     Cultures:	        Radiology

## 2022-11-11 NOTE — PATIENT PROFILE ADULT - NSTOBACCOCESSATIONEDU5_GEN_A_NUR
Withdrawal symptoms include negative mood, urges to smoke, and difficulty concentrating
Left Shoulder 1/2 range, Left Elbow 3/4 range, Hand/wrist 1/4 range

## 2022-11-11 NOTE — DISCHARGE NOTE NURSING/CASE MANAGEMENT/SOCIAL WORK - NSDCPEWEB_GEN_ALL_CORE
[facility] for Tobacco Control website --- [email or web address] website --- [email or web address]

## 2022-11-14 ENCOUNTER — APPOINTMENT (OUTPATIENT)
Dept: PULMONOLOGY | Facility: CLINIC | Age: 72
End: 2022-11-14

## 2022-11-14 VITALS
BODY MASS INDEX: 27.92 KG/M2 | HEART RATE: 74 BPM | DIASTOLIC BLOOD PRESSURE: 81 MMHG | WEIGHT: 195 LBS | HEIGHT: 70 IN | SYSTOLIC BLOOD PRESSURE: 162 MMHG | OXYGEN SATURATION: 99 %

## 2022-11-14 PROCEDURE — 99214 OFFICE O/P EST MOD 30 MIN: CPT

## 2022-11-14 RX ORDER — LEVOFLOXACIN 500 MG/1
500 TABLET, FILM COATED ORAL DAILY
Qty: 10 | Refills: 0 | Status: DISCONTINUED | COMMUNITY
Start: 2022-05-27 | End: 2022-11-14

## 2022-11-14 RX ORDER — METHYLPREDNISOLONE 4 MG/1
4 TABLET ORAL
Qty: 1 | Refills: 0 | Status: DISCONTINUED | COMMUNITY
Start: 2022-05-27 | End: 2022-11-14

## 2022-11-14 RX ORDER — UMECLIDINIUM BROMIDE AND VILANTEROL TRIFENATATE 62.5; 25 UG/1; UG/1
62.5-25 POWDER RESPIRATORY (INHALATION) DAILY
Qty: 60 | Refills: 2 | Status: DISCONTINUED | COMMUNITY
Start: 2022-06-13 | End: 2022-11-14

## 2022-11-14 NOTE — PROCEDURE
[FreeTextEntry1] : Reviewed:\par \par ACC: [id number] EXAM: CT CHEST\par \par PROCEDURE DATE: 11/10/2022\par \par \par \par INTERPRETATION: CLINICAL INFORMATION: Smoker with shortness of breath and cough. COPD\par \par COMPARISON: None.\par \par CONTRAST/COMPLICATIONS:\par IV Contrast: NONE\par Oral Contrast: NONE\par Complications: None reported at time of study completion\par \par PROCEDURE:\par CT of the Chest was performed.\par Sagittal and coronal reformats were performed.\par \par FINDINGS:\par \par LUNGS AND AIRWAYS: Patent central airways. Normal scarring is in the lung apices with small bulla in the right apex. There is an anterolateral left lower lobe nodule measuring 10 x 6 x 6 mm on axial image 133 and coronal image 67. There is a right upper lobe nodule seen on axial image 86 which appears to be associated with a fissure and likely represents benign fissural nodule.\par PLEURA: No pleural effusion.\par MEDIASTINUM AND DALE: No lymphadenopathy.\par VESSELS: There is atherosclerotic calcification of the aorta..\par HEART: Heart size is normal. No pericardial effusion.\par CHEST WALL AND LOWER NECK: Within normal limits.\par VISUALIZED UPPER ABDOMEN: Left renal cysts. Fat-containing umbilical hernia. Colonic diverticulosis.\par BONES: Within normal limits.\par \par IMPRESSION:\par Indeterminant left lower lobe pulmonary nodule should be followed with repeat noncontrast CT scan at 3 months or may be evaluated with PET/CT.\par \par --- End of Report ---\par \par \par \par \par \par TYLER PARIKH MD; Attending Radiologist\par Prior exams reviewed:\par \par PFT: moderate obstruction.  Lung volumes normal. DLCO mildly reduced.\par \par ACC: [id number] EXAM: XR CHEST PORTABLE URGENT 1V\par \par PROCEDURE DATE: 03/10/2022\par \par \par \par INTERPRETATION: AP erect chest on March 10, 2022 at 12:31 PM. Patient has chest pain.\par \par Heart normal for projection.\par \par Lungs remain clear.\par \par Chest similar to May 23, 2021.\par \par IMPRESSION: Negative chest.\par \par --- End of Report ---\par \par \par \par \par \par ASTRID VILLALBA MD; Attending Radiologist\par This document has been electronically signed. Mar 10 2022 1:42PM

## 2022-11-14 NOTE — ASSESSMENT
[FreeTextEntry1] : cont to refrain from smoking\par cont symbicort/spiriva as prescribed from hospital\par will need repeat ct chest in 3 mo\par fu 1 mo to reassess\par \par Total encounter time 30 minutes.\par

## 2022-11-14 NOTE — HISTORY OF PRESENT ILLNESS
[Former] : former [TextBox_4] : hopsitalized at [place] for copd ex\par found to have a 1 cm nodule, believes he has had this in the past\par hasn't smoked the last 2 weeks\par feels tired [YearQuit] : 2022

## 2022-11-18 ENCOUNTER — NON-APPOINTMENT (OUTPATIENT)
Age: 72
End: 2022-11-18

## 2022-12-19 ENCOUNTER — APPOINTMENT (OUTPATIENT)
Dept: PULMONOLOGY | Facility: CLINIC | Age: 72
End: 2022-12-19

## 2022-12-29 ENCOUNTER — APPOINTMENT (OUTPATIENT)
Dept: PULMONOLOGY | Facility: CLINIC | Age: 72
End: 2022-12-29
Payer: MEDICARE

## 2022-12-29 VITALS
WEIGHT: 199 LBS | OXYGEN SATURATION: 100 % | HEIGHT: 70 IN | DIASTOLIC BLOOD PRESSURE: 87 MMHG | HEART RATE: 69 BPM | BODY MASS INDEX: 28.49 KG/M2 | SYSTOLIC BLOOD PRESSURE: 155 MMHG

## 2022-12-29 PROCEDURE — 99213 OFFICE O/P EST LOW 20 MIN: CPT

## 2022-12-29 NOTE — PROCEDURE
[FreeTextEntry1] : Reviewed:\par \par ACC: [id number] EXAM: CT CHEST\par \par PROCEDURE DATE: 11/10/2022\par \par \par \par INTERPRETATION: CLINICAL INFORMATION: Smoker with shortness of breath and cough. COPD\par \par COMPARISON: None.\par \par CONTRAST/COMPLICATIONS:\par IV Contrast: NONE\par Oral Contrast: NONE\par Complications: None reported at time of study completion\par \par PROCEDURE:\par CT of the Chest was performed.\par Sagittal and coronal reformats were performed.\par \par FINDINGS:\par \par LUNGS AND AIRWAYS: Patent central airways. Normal scarring is in the lung apices with small bulla in the right apex. There is an anterolateral left lower lobe nodule measuring 10 x 6 x 6 mm on axial image 133 and coronal image 67. There is a right upper lobe nodule seen on axial image 86 which appears to be associated with a fissure and likely represents benign fissural nodule.\par PLEURA: No pleural effusion.\par MEDIASTINUM AND DALE: No lymphadenopathy.\par VESSELS: There is atherosclerotic calcification of the aorta..\par HEART: Heart size is normal. No pericardial effusion.\par CHEST WALL AND LOWER NECK: Within normal limits.\par VISUALIZED UPPER ABDOMEN: Left renal cysts. Fat-containing umbilical hernia. Colonic diverticulosis.\par BONES: Within normal limits.\par \par IMPRESSION:\par Indeterminant left lower lobe pulmonary nodule should be followed with repeat noncontrast CT scan at 3 months or may be evaluated with PET/CT.\par \par --- End of Report ---\par \par \par \par \par \par TYLER PARIKH MD; Attending Radiologist\par Prior exams reviewed:\par \par PFT: moderate obstruction.  Lung volumes normal. DLCO mildly reduced.\par \par ACC: [id number] EXAM: XR CHEST PORTABLE URGENT 1V\par \par PROCEDURE DATE: 03/10/2022\par \par \par \par INTERPRETATION: AP erect chest on March 10, 2022 at 12:31 PM. Patient has chest pain.\par \par Heart normal for projection.\par \par Lungs remain clear.\par \par Chest similar to May 23, 2021.\par \par IMPRESSION: Negative chest.\par \par --- End of Report ---\par \par \par \par \par \par ASTRID VILLALBA MD; Attending Radiologist\par This document has been electronically signed. Mar 10 2022 1:42PM

## 2022-12-29 NOTE — HISTORY OF PRESENT ILLNESS
[Current] : current [TextBox_4] : here to follow up\par no resp complaints\par not using inhalers\par smoking?? wife says yes...

## 2023-02-23 NOTE — PROCEDURE
Problem: PHYSICAL THERAPY ADULT  Goal: Performs mobility at highest level of function for planned discharge setting  See evaluation for individualized goals  Description: Treatment/Interventions: Functional transfer training, LE strengthening/ROM, Endurance training, Patient/family training, Bed mobility, Gait training, Spoke to nursing, Spoke to case management, OT          See flowsheet documentation for full assessment, interventions and recommendations  Outcome: Progressing  Note: Prognosis: Fair  Problem List: Decreased endurance, Decreased mobility  Assessment: The patient was initially fatigued, but after a few minutes she became readily alert and interactive  She was expressive about her situation, but she was agreeable to return to bed as she had been out in the chair for over four hours  The patient was educated about the importance of mobilizing several times with staff in order to preserve her skin integrity as well as progress her mobility She was notably stiff, but this gradually improved with therapeutic exercise  She remains limited due to decreased strength, balance, and activity tolerance  The patient was in bed with her heels and LUE propped on pillows as well as all needs within reach  Barriers to Discharge: Inaccessible home environment, Decreased caregiver support     PT Discharge Recommendation: Post acute rehabilitation services    See flowsheet documentation for full assessment  [FreeTextEntry1] : PFT: moderate obstruction.  Lung volumes normal. DLCO mildly reduced.\par \par \par reviewed:\par \par ACC: [id number] EXAM: XR CHEST PORTABLE URGENT 1V\par \par PROCEDURE DATE: 03/10/2022\par \par \par \par INTERPRETATION: AP erect chest on March 10, 2022 at 12:31 PM. Patient has chest pain.\par \par Heart normal for projection.\par \par Lungs remain clear.\par \par Chest similar to May 23, 2021.\par \par IMPRESSION: Negative chest.\par \par --- End of Report ---\par \par \par \par \par \par ASTRID VILLALBA MD; Attending Radiologist\par This document has been electronically signed. Mar 10 2022 1:42PM

## 2023-03-10 ENCOUNTER — APPOINTMENT (OUTPATIENT)
Dept: CT IMAGING | Facility: CLINIC | Age: 73
End: 2023-03-10
Payer: MEDICARE

## 2023-03-10 ENCOUNTER — OUTPATIENT (OUTPATIENT)
Dept: OUTPATIENT SERVICES | Facility: HOSPITAL | Age: 73
LOS: 1 days | End: 2023-03-10
Payer: MEDICARE

## 2023-03-10 DIAGNOSIS — R91.1 SOLITARY PULMONARY NODULE: ICD-10-CM

## 2023-03-10 PROCEDURE — 71250 CT THORAX DX C-: CPT | Mod: 26,MH

## 2023-03-10 PROCEDURE — 71250 CT THORAX DX C-: CPT

## 2023-10-25 ENCOUNTER — NON-APPOINTMENT (OUTPATIENT)
Age: 73
End: 2023-10-25

## 2023-10-25 ENCOUNTER — APPOINTMENT (OUTPATIENT)
Dept: INTERNAL MEDICINE | Facility: CLINIC | Age: 73
End: 2023-10-25
Payer: MEDICARE

## 2023-10-25 ENCOUNTER — LABORATORY RESULT (OUTPATIENT)
Age: 73
End: 2023-10-25

## 2023-10-25 VITALS
TEMPERATURE: 95.4 F | RESPIRATION RATE: 16 BRPM | OXYGEN SATURATION: 98 % | HEIGHT: 70 IN | HEART RATE: 66 BPM | SYSTOLIC BLOOD PRESSURE: 138 MMHG | WEIGHT: 194 LBS | DIASTOLIC BLOOD PRESSURE: 84 MMHG | BODY MASS INDEX: 27.77 KG/M2

## 2023-10-25 DIAGNOSIS — Z12.11 ENCOUNTER FOR SCREENING FOR MALIGNANT NEOPLASM OF COLON: ICD-10-CM

## 2023-10-25 DIAGNOSIS — Z13.31 ENCOUNTER FOR SCREENING FOR DEPRESSION: ICD-10-CM

## 2023-10-25 DIAGNOSIS — Z13.1 ENCOUNTER FOR SCREENING FOR DIABETES MELLITUS: ICD-10-CM

## 2023-10-25 DIAGNOSIS — Z00.00 ENCOUNTER FOR GENERAL ADULT MEDICAL EXAMINATION W/OUT ABNORMAL FINDINGS: ICD-10-CM

## 2023-10-25 PROCEDURE — 93000 ELECTROCARDIOGRAM COMPLETE: CPT | Mod: 59

## 2023-10-25 PROCEDURE — G0444 DEPRESSION SCREEN ANNUAL: CPT | Mod: 59

## 2023-10-25 PROCEDURE — G0439: CPT

## 2023-10-25 PROCEDURE — 36415 COLL VENOUS BLD VENIPUNCTURE: CPT

## 2023-10-26 PROBLEM — Z13.1 SCREENING FOR DIABETES MELLITUS: Status: ACTIVE | Noted: 2023-10-24

## 2023-10-26 PROBLEM — Z13.31 DEPRESSION SCREEN: Status: ACTIVE | Noted: 2023-10-26

## 2023-10-29 ENCOUNTER — NON-APPOINTMENT (OUTPATIENT)
Age: 73
End: 2023-10-29

## 2023-10-30 ENCOUNTER — NON-APPOINTMENT (OUTPATIENT)
Age: 73
End: 2023-10-30

## 2023-10-30 LAB
25(OH)D3 SERPL-MCNC: 31.7 NG/ML
ALBUMIN SERPL ELPH-MCNC: 4.8 G/DL
ALP BLD-CCNC: 64 U/L
ALT SERPL-CCNC: 15 U/L
ANION GAP SERPL CALC-SCNC: 10 MMOL/L
APPEARANCE: CLEAR
AST SERPL-CCNC: 17 U/L
BASOPHILS # BLD AUTO: 0.02 K/UL
BASOPHILS NFR BLD AUTO: 0.4 %
BILIRUB SERPL-MCNC: 0.6 MG/DL
BILIRUBIN URINE: NEGATIVE
BLOOD URINE: NEGATIVE
BUN SERPL-MCNC: 13 MG/DL
CALCIUM SERPL-MCNC: 9.9 MG/DL
CHLORIDE SERPL-SCNC: 103 MMOL/L
CHOLEST SERPL-MCNC: 134 MG/DL
CO2 SERPL-SCNC: 25 MMOL/L
COLOR: YELLOW
CREAT SERPL-MCNC: 1.13 MG/DL
EGFR: 68 ML/MIN/1.73M2
EOSINOPHIL # BLD AUTO: 0.3 K/UL
EOSINOPHIL NFR BLD AUTO: 5.8 %
ESTIMATED AVERAGE GLUCOSE: 117 MG/DL
GLUCOSE QUALITATIVE U: NEGATIVE MG/DL
GLUCOSE SERPL-MCNC: 104 MG/DL
HBA1C MFR BLD HPLC: 5.7 %
HCT VFR BLD CALC: 48.9 %
HDLC SERPL-MCNC: 40 MG/DL
HGB BLD-MCNC: 15.7 G/DL
IMM GRANULOCYTES NFR BLD AUTO: 0.2 %
KETONES URINE: NEGATIVE MG/DL
LDLC SERPL CALC-MCNC: 80 MG/DL
LEUKOCYTE ESTERASE URINE: ABNORMAL
LYMPHOCYTES # BLD AUTO: 1.31 K/UL
LYMPHOCYTES NFR BLD AUTO: 25.1 %
MAN DIFF?: NORMAL
MCHC RBC-ENTMCNC: 29.4 PG
MCHC RBC-ENTMCNC: 32.1 GM/DL
MCV RBC AUTO: 91.6 FL
MONOCYTES # BLD AUTO: 0.51 K/UL
MONOCYTES NFR BLD AUTO: 9.8 %
NEUTROPHILS # BLD AUTO: 3.06 K/UL
NEUTROPHILS NFR BLD AUTO: 58.7 %
NITRITE URINE: NEGATIVE
NONHDLC SERPL-MCNC: 94 MG/DL
PH URINE: 6
PLATELET # BLD AUTO: 124 K/UL
POTASSIUM SERPL-SCNC: 5.2 MMOL/L
PROT SERPL-MCNC: 7.3 G/DL
PROTEIN URINE: NEGATIVE MG/DL
PSA SERPL-MCNC: 3.73 NG/ML
RBC # BLD: 5.34 M/UL
RBC # FLD: 13.4 %
SODIUM SERPL-SCNC: 139 MMOL/L
SPECIFIC GRAVITY URINE: 1.02
TRIGL SERPL-MCNC: 71 MG/DL
TSH SERPL-ACNC: 1.52 UIU/ML
UROBILINOGEN URINE: 0.2 MG/DL
WBC # FLD AUTO: 5.21 K/UL

## 2023-11-16 ENCOUNTER — APPOINTMENT (OUTPATIENT)
Dept: INTERNAL MEDICINE | Facility: CLINIC | Age: 73
End: 2023-11-16
Payer: MEDICARE

## 2023-11-16 VITALS
BODY MASS INDEX: 27.77 KG/M2 | SYSTOLIC BLOOD PRESSURE: 121 MMHG | HEIGHT: 70 IN | TEMPERATURE: 97.3 F | HEART RATE: 84 BPM | OXYGEN SATURATION: 97 % | WEIGHT: 194 LBS | DIASTOLIC BLOOD PRESSURE: 70 MMHG | RESPIRATION RATE: 16 BRPM

## 2023-11-16 DIAGNOSIS — R06.2 WHEEZING: ICD-10-CM

## 2023-11-16 DIAGNOSIS — F17.200 NICOTINE DEPENDENCE, UNSPECIFIED, UNCOMPLICATED: ICD-10-CM

## 2023-11-16 PROCEDURE — 99213 OFFICE O/P EST LOW 20 MIN: CPT

## 2023-11-17 PROBLEM — R06.2 WHEEZING: Status: ACTIVE | Noted: 2023-11-16

## 2023-11-17 PROBLEM — F17.200 CURRENT EVERY DAY SMOKER: Status: ACTIVE | Noted: 2019-02-25

## 2023-11-17 RX ORDER — NIRMATRELVIR AND RITONAVIR 300-100 MG
20 X 150 MG & KIT ORAL
Qty: 1 | Refills: 0 | Status: DISCONTINUED | COMMUNITY
Start: 2023-03-02 | End: 2023-11-17

## 2023-11-17 RX ORDER — BENZONATATE 200 MG/1
200 CAPSULE ORAL 3 TIMES DAILY
Qty: 30 | Refills: 0 | Status: DISCONTINUED | COMMUNITY
Start: 2022-02-05 | End: 2023-11-17

## 2023-11-18 LAB — SARS-COV-2 N GENE NPH QL NAA+PROBE: NOT DETECTED

## 2023-12-10 ENCOUNTER — RX RENEWAL (OUTPATIENT)
Age: 73
End: 2023-12-10

## 2024-02-09 ENCOUNTER — RX RENEWAL (OUTPATIENT)
Age: 74
End: 2024-02-09

## 2024-02-09 RX ORDER — ALBUTEROL SULFATE 90 UG/1
108 (90 BASE) INHALANT RESPIRATORY (INHALATION)
Qty: 1 | Refills: 1 | Status: ACTIVE | COMMUNITY
Start: 2022-03-08 | End: 1900-01-01

## 2024-02-22 ENCOUNTER — APPOINTMENT (OUTPATIENT)
Dept: PULMONOLOGY | Facility: CLINIC | Age: 74
End: 2024-02-22
Payer: MEDICARE

## 2024-02-22 VITALS
OXYGEN SATURATION: 96 % | WEIGHT: 190 LBS | DIASTOLIC BLOOD PRESSURE: 85 MMHG | SYSTOLIC BLOOD PRESSURE: 133 MMHG | BODY MASS INDEX: 27.26 KG/M2 | HEART RATE: 75 BPM

## 2024-02-22 DIAGNOSIS — J44.1 CHRONIC OBSTRUCTIVE PULMONARY DISEASE WITH (ACUTE) EXACERBATION: ICD-10-CM

## 2024-02-22 PROCEDURE — 94727 GAS DIL/WSHOT DETER LNG VOL: CPT

## 2024-02-22 PROCEDURE — 99214 OFFICE O/P EST MOD 30 MIN: CPT | Mod: 25

## 2024-02-22 PROCEDURE — ZZZZZ: CPT

## 2024-02-22 PROCEDURE — 95012 NITRIC OXIDE EXP GAS DETER: CPT

## 2024-02-22 PROCEDURE — 94729 DIFFUSING CAPACITY: CPT

## 2024-02-22 PROCEDURE — 94060 EVALUATION OF WHEEZING: CPT

## 2024-02-22 PROCEDURE — 99407 BEHAV CHNG SMOKING > 10 MIN: CPT | Mod: 25

## 2024-02-22 PROCEDURE — 71046 X-RAY EXAM CHEST 2 VIEWS: CPT

## 2024-02-22 RX ORDER — TIOTROPIUM BROMIDE 18 UG/1
18 CAPSULE ORAL; RESPIRATORY (INHALATION) DAILY
Qty: 30 | Refills: 2 | Status: COMPLETED | COMMUNITY
Start: 2022-11-14 | End: 2024-02-22

## 2024-02-22 RX ORDER — AZITHROMYCIN 250 MG/1
250 TABLET, FILM COATED ORAL
Qty: 1 | Refills: 0 | Status: COMPLETED | COMMUNITY
Start: 2024-02-09 | End: 2024-02-22

## 2024-02-22 RX ORDER — METHYLPREDNISOLONE 4 MG/1
4 TABLET ORAL
Qty: 1 | Refills: 0 | Status: COMPLETED | COMMUNITY
Start: 2023-11-16 | End: 2024-02-22

## 2024-02-22 RX ORDER — PREDNISONE 20 MG/1
20 TABLET ORAL
Qty: 10 | Refills: 0 | Status: ACTIVE | COMMUNITY
Start: 2024-02-22 | End: 1900-01-01

## 2024-02-22 RX ORDER — BUDESONIDE AND FORMOTEROL FUMARATE DIHYDRATE 160; 4.5 UG/1; UG/1
160-4.5 AEROSOL RESPIRATORY (INHALATION) TWICE DAILY
Qty: 1 | Refills: 2 | Status: COMPLETED | COMMUNITY
Start: 2022-11-14 | End: 2024-02-22

## 2024-02-22 RX ORDER — AZITHROMYCIN 250 MG/1
250 TABLET, FILM COATED ORAL
Qty: 1 | Refills: 0 | Status: ACTIVE | COMMUNITY
Start: 2024-02-22 | End: 1900-01-01

## 2024-02-22 RX ORDER — METHYLPREDNISOLONE 4 MG/1
4 TABLET ORAL
Qty: 1 | Refills: 0 | Status: COMPLETED | COMMUNITY
Start: 2024-02-09 | End: 2024-02-22

## 2024-02-22 RX ORDER — LEVOFLOXACIN 500 MG/1
500 TABLET, FILM COATED ORAL DAILY
Qty: 14 | Refills: 0 | Status: COMPLETED | COMMUNITY
Start: 2023-11-16 | End: 2024-02-22

## 2024-02-22 NOTE — PHYSICAL EXAM
[Normal Oropharynx] : normal oropharynx [No Acute Distress] : no acute distress [No Neck Mass] : no neck mass [Normal Appearance] : normal appearance [Normal S1, S2] : normal s1, s2 [Normal Rate/Rhythm] : normal rate/rhythm [No Murmurs] : no murmurs [Wheeze] : wheeze [Clear to Auscultation Bilaterally] : clear to auscultation bilaterally [No Abnormalities] : no abnormalities [Benign] : benign [No Cyanosis] : no cyanosis [Normal Gait] : normal gait [No Clubbing] : no clubbing [No Edema] : no edema [FROM] : FROM [Normal Color/ Pigmentation] : normal color/ pigmentation [No Focal Deficits] : no focal deficits [Oriented x3] : oriented x3 [Normal Affect] : normal affect

## 2024-02-23 NOTE — PROCEDURE
[FreeTextEntry1] : Pulmonary function test performed in office today: Spirometry shows severe obstructive airway disease with significant improvement postbronchodilator, consistent with asthma; lung volumes within normal limits with air trapping; diffusion shows mild impairment. _______  Exhaled Nitric Oxide             Final  No Documents Attached    	Test  	Result  	Flag	Reference	Goal 	Exhaled Nitric Oxide	254	H	 	  Ordered by: BLANKA KNIGHT       Collected/Examined: [id number] 02:13PM       Verified by: BLANKA KNIGHT [id number] 04:05PM       Result Communication: No patient communication needed at this time; Stage: Final       Performed at: In Office       Performed by: LEANN SARGENT       Resulted: [id number] 02:13PM       Last Updated: [id number] 04:05PM       Accession: [id number]

## 2024-02-23 NOTE — DISCUSSION/SUMMARY
[FreeTextEntry1] : Cough and shortness of breath secondary to COPD exacerbation from history of cigarette smoking.

## 2024-02-23 NOTE — HISTORY OF PRESENT ILLNESS
[TextBox_4] : Acute   Having productive cough, shortness of breath, and fatigue x 10-15 days; was prescribed antibiotics last week, which did not help  Has been using albuterol pump 2-3 times a day and he nebulizer 1-3 times a day; does not use maintenance inhalers   Ex smoker, just quit 2 weeks ago, 1 PPD for 50 years.  COPD

## 2024-02-23 NOTE — ASSESSMENT
[FreeTextEntry1] : 15 minutes spent in cigarette smoking cessation counseling. Educated patient on deleterious effects and all potential consequences of cigarette smoking, such as COPD, lung cancer, etc. Counseled patient on various smoking cessation techniques, such as nicotine patch, Zyban, acupuncture, etc.  Patient agreed to attempt cigarette smoking cessation.  Will follow up on cigarette smoking cessation on next office visit. Start Z-Travis and prednisone 40 mg p.o. daily for 5 days for COPD exacerbation. Start Trelegy 100/62.5/25 mcg Ellipta, 1 puff daily for COPD. Will repeat low-dose lung cancer screening chest CT scan in 1 month. Return for follow-up in 1 month.

## 2024-03-01 ENCOUNTER — APPOINTMENT (OUTPATIENT)
Dept: PULMONOLOGY | Facility: CLINIC | Age: 74
End: 2024-03-01

## 2024-03-21 ENCOUNTER — RESULT CHARGE (OUTPATIENT)
Age: 74
End: 2024-03-21

## 2024-03-22 ENCOUNTER — APPOINTMENT (OUTPATIENT)
Dept: PULMONOLOGY | Facility: CLINIC | Age: 74
End: 2024-03-22
Payer: MEDICARE

## 2024-03-22 VITALS — HEART RATE: 75 BPM | SYSTOLIC BLOOD PRESSURE: 120 MMHG | OXYGEN SATURATION: 95 % | DIASTOLIC BLOOD PRESSURE: 76 MMHG

## 2024-03-22 PROCEDURE — 99407 BEHAV CHNG SMOKING > 10 MIN: CPT | Mod: 25

## 2024-03-22 PROCEDURE — 95012 NITRIC OXIDE EXP GAS DETER: CPT

## 2024-03-22 PROCEDURE — 99214 OFFICE O/P EST MOD 30 MIN: CPT | Mod: 25

## 2024-03-22 PROCEDURE — 94060 EVALUATION OF WHEEZING: CPT

## 2024-03-24 ENCOUNTER — NON-APPOINTMENT (OUTPATIENT)
Age: 74
End: 2024-03-24

## 2024-03-24 VITALS — WEIGHT: 190 LBS | HEIGHT: 70 IN | BODY MASS INDEX: 27.2 KG/M2

## 2024-03-24 NOTE — HISTORY OF PRESENT ILLNESS
[Current] : Current [PacksperYear] : 49 [TextBox_13] : Patient is scheduled for a baseline LDCT for lung cancer screening. Referred by Dr. Suzan Sanchez.  Chart review performed to confirm eligibility for LDCT.   No documented personal or family history of lung cancer. No documented s/s of lung cancer.

## 2024-03-24 NOTE — ADDENDUM
[FreeTextEntry1] : I, Crispin Keatingtoi, acted solely as a scribe for Dr. Suzan Sanchez D.O., on this date 03/22/2024.   All medical record entries made by the Scribe were at my, Dr. Suzan Sanchez D.O., direction and personally dictated by me on 03/22/2024. I have reviewed the chart and agree that the record accurately reflects my personal performance of the history, physical exam, assessment and plan. I have also personally directed, reviewed, and agreed with the chart.

## 2024-03-24 NOTE — ASSESSMENT
[FreeTextEntry1] : 15 minutes spent in cigarette smoking cessation counseling. Educated patient on deleterious effects and all potential consequences of cigarette smoking, such as COPD, lung cancer, etc. Counseled patient on various smoking cessation techniques, such as nicotine patch, Zyban, acupuncture, etc.  Patient agreed to attempt cigarette smoking cessation.  Will follow up on cigarette smoking cessation on next office visit. Obtained and reviewed spirometry, NIOX results with patient today.  Continue Trelegy 100/62.5/25 mcg Ellipta, 1 puff daily for COPD. Repeat low-dose lung cancer screening chest CT scan for follow up. Return for follow-up in 1 month after receiving chest CT scan.

## 2024-03-24 NOTE — PROCEDURE
[FreeTextEntry1] : Spirometry shows moderate obstructive airway disease with some improvement post bronchodilator. ___  Exhaled Nitric Oxide             Final  No Documents Attached    	Test  	Result  	Flag	Reference	Goal	Last Verified  	Exhaled Nitric Oxide	35	 	 		REQUIRED  Ordered by: LEANN SARGENT       Collected/Examined: 22Mar2024 03:32PM       Verification Required       Stage: Final       Performed at: In Office       Performed by: LEANN SARGENT       Resulted: 22Mar2024 03:32PM       Last Updated: 22Mar2024 03:32PM

## 2024-03-24 NOTE — HISTORY OF PRESENT ILLNESS
[TextBox_4] : PABLO DREW is a 73 year old male, with history of COPD, lung nodule, who presents to the office for follow up evaluation. Patient reports that he is feeling well overall with no respiratory complaints. He denies of having any symptoms of chest pain or dyspnea. Patient reports that he continues to take Trelegy on a daily basis for COPD treatment.

## 2024-04-01 ENCOUNTER — OUTPATIENT (OUTPATIENT)
Dept: OUTPATIENT SERVICES | Facility: HOSPITAL | Age: 74
LOS: 1 days | End: 2024-04-01
Payer: MEDICARE

## 2024-04-01 ENCOUNTER — APPOINTMENT (OUTPATIENT)
Dept: CT IMAGING | Facility: CLINIC | Age: 74
End: 2024-04-01
Payer: MEDICARE

## 2024-04-01 DIAGNOSIS — F17.200 NICOTINE DEPENDENCE, UNSPECIFIED, UNCOMPLICATED: ICD-10-CM

## 2024-04-01 PROCEDURE — 71271 CT THORAX LUNG CANCER SCR C-: CPT

## 2024-04-01 PROCEDURE — 71271 CT THORAX LUNG CANCER SCR C-: CPT | Mod: 26

## 2024-04-16 ENCOUNTER — APPOINTMENT (OUTPATIENT)
Dept: PULMONOLOGY | Facility: CLINIC | Age: 74
End: 2024-04-16
Payer: MEDICARE

## 2024-04-16 VITALS — SYSTOLIC BLOOD PRESSURE: 114 MMHG | DIASTOLIC BLOOD PRESSURE: 71 MMHG | HEART RATE: 68 BPM | OXYGEN SATURATION: 95 %

## 2024-04-16 DIAGNOSIS — R06.00 DYSPNEA, UNSPECIFIED: ICD-10-CM

## 2024-04-16 DIAGNOSIS — F17.200 NICOTINE DEPENDENCE, UNSPECIFIED, UNCOMPLICATED: ICD-10-CM

## 2024-04-16 PROCEDURE — 99214 OFFICE O/P EST MOD 30 MIN: CPT

## 2024-04-16 PROCEDURE — 99407 BEHAV CHNG SMOKING > 10 MIN: CPT

## 2024-04-16 RX ORDER — BUDESONIDE AND FORMOTEROL FUMARATE DIHYDRATE 160; 4.5 UG/1; UG/1
160-4.5 AEROSOL RESPIRATORY (INHALATION) TWICE DAILY
Qty: 3 | Refills: 3 | Status: ACTIVE | COMMUNITY
Start: 2024-04-16 | End: 1900-01-01

## 2024-04-17 PROBLEM — F17.200 NICOTINE DEPENDENCE: Status: ACTIVE | Noted: 2022-06-13

## 2024-04-17 PROBLEM — R06.00 DYSPNEA: Status: ACTIVE | Noted: 2019-06-24

## 2024-04-17 NOTE — ASSESSMENT
[FreeTextEntry1] : 15 minutes spent in cigarette smoking cessation counseling. Educated patient on deleterious effects and all potential consequences of cigarette smoking, such as COPD, lung cancer, etc. Counseled patient on various smoking cessation techniques, such as nicotine patch, Zyban, acupuncture, etc.  Patient agreed to attempt cigarette smoking cessation.  Will follow up on cigarette smoking cessation on next office visit. Obtained and reviewed chest CT scan results with patient today.  Continue Trelegy 100/62.5/25 mcg Ellipta, 1 puff daily for COPD. Repeat low-dose lung cancer screening chest CT scan for follow up yearly basis. Return for follow-up in 3 months.

## 2024-04-17 NOTE — PROCEDURE
[FreeTextEntry1] :  CT Chest Lung Cancer Screening             Final  No Documents Attached    	 EXAM: [id number] - CT LDCT LUNG CA SCREENING  - ORDERED BY: LEANN SARGENT   PROCEDURE DATE:  04/01/2024    INTERPRETATION:  CLINICAL INDICATION: Lung cancer screening, current smoker with a 50 pack year smoking history.  Axial CT images of the chest are obtained without intravenous administration of contrast.  Comparison is made with the prior chest CT of March 10, 2023.  No enlarged axillary, mediastinal or hilar lymph nodes.  No pleural or pericardial effusion. Mild aortic root calcification. Mild intimal calcifications of the aorta. Heart size is normal.  Evaluation of the upper abdomen demonstrate partially imaged cyst within the left kidney.  Evaluation of the lungs demonstrate mild biapical scarring unchanged. Emphysema. A few bilateral pulmonary nodules with the largest in the left lower lobe measuring up to about 8 mm are unchanged since November 10, 2022.  No pneumonia. No central endobronchial lesions.  Degenerative changes of the spine.  IMPRESSION: Bilateral pulmonary nodules are unchanged since November 10, 2022.  Lung-RADS 2 (Benign Appearance or Behavior) : Continue annual screening with low-dose chest CT in 12 months if the person continues to meet eligibility criteria.  --- End of Report ---       LALITO REDMOND MD; Attending Radiologist This document has been electronically signed. Apr 10 2024  8:42AM  Ordered by: LEANN SARGENT       Collected/Examined: 01Apr2024 09:44AM       Verified by: LEANN SARGENT 14Apr2024 07:03AM       Result Communication: No patient communication needed at this time; Stage: Final       Performed at: [facility]       Resulted: [id number] 08:33AM       Last Updated: 14Apr2024 07:03AM       Accession: [id number]

## 2024-04-17 NOTE — DISCUSSION/SUMMARY
[FreeTextEntry1] : Mr. PABLO DREW is an 73 year old male, history of COPD, lung nodules, longstanding former cigarette smoking. Improved lung function although still not normal.

## 2024-04-19 ENCOUNTER — APPOINTMENT (OUTPATIENT)
Dept: PULMONOLOGY | Facility: CLINIC | Age: 74
End: 2024-04-19
Payer: MEDICARE

## 2024-04-19 VITALS
BODY MASS INDEX: 27.2 KG/M2 | SYSTOLIC BLOOD PRESSURE: 134 MMHG | HEIGHT: 70 IN | OXYGEN SATURATION: 98 % | DIASTOLIC BLOOD PRESSURE: 71 MMHG | HEART RATE: 72 BPM | WEIGHT: 190 LBS

## 2024-04-19 DIAGNOSIS — J44.9 CHRONIC OBSTRUCTIVE PULMONARY DISEASE, UNSPECIFIED: ICD-10-CM

## 2024-04-19 DIAGNOSIS — R91.1 SOLITARY PULMONARY NODULE: ICD-10-CM

## 2024-04-19 PROCEDURE — 99214 OFFICE O/P EST MOD 30 MIN: CPT

## 2024-04-22 ENCOUNTER — APPOINTMENT (OUTPATIENT)
Dept: PULMONOLOGY | Facility: CLINIC | Age: 74
End: 2024-04-22

## 2024-06-06 RX ORDER — FLUTICASONE FUROATE, UMECLIDINIUM BROMIDE AND VILANTEROL TRIFENATATE 100; 62.5; 25 UG/1; UG/1; UG/1
100-62.5-25 POWDER RESPIRATORY (INHALATION)
Qty: 3 | Refills: 1 | Status: ACTIVE | COMMUNITY
Start: 2024-02-23 | End: 1900-01-01

## 2024-07-23 DIAGNOSIS — K21.9 GASTRO-ESOPHAGEAL REFLUX DISEASE W/OUT ESOPHAGITIS: ICD-10-CM

## 2024-07-23 RX ORDER — OMEPRAZOLE 20 MG/1
20 CAPSULE, DELAYED RELEASE ORAL
Qty: 90 | Refills: 1 | Status: ACTIVE | COMMUNITY
Start: 2024-07-23 | End: 1900-01-01

## 2024-08-13 ENCOUNTER — APPOINTMENT (OUTPATIENT)
Dept: PULMONOLOGY | Facility: CLINIC | Age: 74
End: 2024-08-13

## 2024-08-19 ENCOUNTER — APPOINTMENT (OUTPATIENT)
Dept: PULMONOLOGY | Facility: CLINIC | Age: 74
End: 2024-08-19
Payer: MEDICARE

## 2024-08-19 DIAGNOSIS — J44.9 CHRONIC OBSTRUCTIVE PULMONARY DISEASE, UNSPECIFIED: ICD-10-CM

## 2024-08-19 PROCEDURE — 94010 BREATHING CAPACITY TEST: CPT

## 2024-08-19 PROCEDURE — ZZZZZ: CPT

## 2024-08-19 PROCEDURE — 99214 OFFICE O/P EST MOD 30 MIN: CPT | Mod: 25

## 2024-08-19 PROCEDURE — 94726 PLETHYSMOGRAPHY LUNG VOLUMES: CPT

## 2024-08-19 PROCEDURE — 94729 DIFFUSING CAPACITY: CPT

## 2024-08-19 NOTE — ASSESSMENT
[FreeTextEntry1] : smoking cessation counseling will remain off trelegy for now lung ca screen due april 2025 fu 6 mo

## 2024-08-19 NOTE — PROCEDURE
[FreeTextEntry1] : PFT: mild obstruction.  Lung volumes normal. DLCO normal.  improved from prior.   	 EXAM: [id number] - CT LDCT LUNG CA SCREENING  - ORDERED BY: LEANN SARGENT   PROCEDURE DATE:  04/01/2024    INTERPRETATION:  CLINICAL INDICATION: Lung cancer screening, current smoker with a 50 pack year smoking history.  Axial CT images of the chest are obtained without intravenous administration of contrast.  Comparison is made with the prior chest CT of March 10, 2023.  No enlarged axillary, mediastinal or hilar lymph nodes.  No pleural or pericardial effusion. Mild aortic root calcification. Mild intimal calcifications of the aorta. Heart size is normal.  Evaluation of the upper abdomen demonstrate partially imaged cyst within the left kidney.  Evaluation of the lungs demonstrate mild biapical scarring unchanged. Emphysema. A few bilateral pulmonary nodules with the largest in the left lower lobe measuring up to about 8 mm are unchanged since November 10, 2022.  No pneumonia. No central endobronchial lesions.  Degenerative changes of the spine.  IMPRESSION: Bilateral pulmonary nodules are unchanged since November 10, 2022.  Lung-RADS 2 (Benign Appearance or Behavior) : Continue annual screening with low-dose chest CT in 12 months if the person continues to meet eligibility criteria.  --- End of Report ---       LALITO REDMOND MD; Attending Radiologist This document has been electronically signed. Apr 10 2024  8:42AM  Prior exams reviewed:  ACC: [id number] EXAM: CT CHEST  PROCEDURE DATE: 11/10/2022    INTERPRETATION: CLINICAL INFORMATION: Smoker with shortness of breath and cough. COPD  COMPARISON: None.  CONTRAST/COMPLICATIONS: IV Contrast: NONE Oral Contrast: NONE Complications: None reported at time of study completion  PROCEDURE: CT of the Chest was performed. Sagittal and coronal reformats were performed.  FINDINGS:  LUNGS AND AIRWAYS: Patent central airways. Normal scarring is in the lung apices with small bulla in the right apex. There is an anterolateral left lower lobe nodule measuring 10 x 6 x 6 mm on axial image 133 and coronal image 67. There is a right upper lobe nodule seen on axial image 86 which appears to be associated with a fissure and likely represents benign fissural nodule. PLEURA: No pleural effusion. MEDIASTINUM AND DALE: No lymphadenopathy. VESSELS: There is atherosclerotic calcification of the aorta.. HEART: Heart size is normal. No pericardial effusion. CHEST WALL AND LOWER NECK: Within normal limits. VISUALIZED UPPER ABDOMEN: Left renal cysts. Fat-containing umbilical hernia. Colonic diverticulosis. BONES: Within normal limits.  IMPRESSION: Indeterminant left lower lobe pulmonary nodule should be followed with repeat noncontrast CT scan at 3 months or may be evaluated with PET/CT.  --- End of Report ---      TYLER PARIKH MD; Attending Radiologist Prior exams reviewed:  PFT: moderate obstruction.  Lung volumes normal. DLCO mildly reduced.  ACC: [id number] EXAM: XR CHEST PORTABLE URGENT 1V  PROCEDURE DATE: 03/10/2022    INTERPRETATION: AP erect chest on March 10, 2022 at 12:31 PM. Patient has chest pain.  Heart normal for projection.  Lungs remain clear.  Chest similar to May 23, 2021.  IMPRESSION: Negative chest.  --- End of Report ---      ASTRID VILLALBA MD; Attending Radiologist This document has been electronically signed. Mar 10 2022 1:42PM

## 2024-11-04 ENCOUNTER — APPOINTMENT (OUTPATIENT)
Dept: INTERNAL MEDICINE | Facility: CLINIC | Age: 74
End: 2024-11-04
Payer: MEDICARE

## 2024-11-04 ENCOUNTER — NON-APPOINTMENT (OUTPATIENT)
Age: 74
End: 2024-11-04

## 2024-11-04 VITALS
BODY MASS INDEX: 28.06 KG/M2 | WEIGHT: 196 LBS | HEART RATE: 61 BPM | TEMPERATURE: 97.9 F | OXYGEN SATURATION: 98 % | HEIGHT: 70 IN | SYSTOLIC BLOOD PRESSURE: 130 MMHG | DIASTOLIC BLOOD PRESSURE: 72 MMHG

## 2024-11-04 DIAGNOSIS — Z13.1 ENCOUNTER FOR SCREENING FOR DIABETES MELLITUS: ICD-10-CM

## 2024-11-04 DIAGNOSIS — J44.9 CHRONIC OBSTRUCTIVE PULMONARY DISEASE, UNSPECIFIED: ICD-10-CM

## 2024-11-04 DIAGNOSIS — Z00.00 ENCOUNTER FOR GENERAL ADULT MEDICAL EXAMINATION W/OUT ABNORMAL FINDINGS: ICD-10-CM

## 2024-11-04 DIAGNOSIS — R91.1 SOLITARY PULMONARY NODULE: ICD-10-CM

## 2024-11-04 DIAGNOSIS — K21.9 GASTRO-ESOPHAGEAL REFLUX DISEASE W/OUT ESOPHAGITIS: ICD-10-CM

## 2024-11-04 DIAGNOSIS — F17.200 NICOTINE DEPENDENCE, UNSPECIFIED, UNCOMPLICATED: ICD-10-CM

## 2024-11-04 DIAGNOSIS — Z13.31 ENCOUNTER FOR SCREENING FOR DEPRESSION: ICD-10-CM

## 2024-11-04 PROCEDURE — 93000 ELECTROCARDIOGRAM COMPLETE: CPT | Mod: 59

## 2024-11-04 PROCEDURE — G0444 DEPRESSION SCREEN ANNUAL: CPT | Mod: 59

## 2024-11-04 PROCEDURE — 99214 OFFICE O/P EST MOD 30 MIN: CPT | Mod: 25

## 2024-11-04 PROCEDURE — G0439: CPT

## 2024-11-04 PROCEDURE — 36415 COLL VENOUS BLD VENIPUNCTURE: CPT

## 2025-01-25 DIAGNOSIS — R05.9 COUGH, UNSPECIFIED: ICD-10-CM

## 2025-01-25 RX ORDER — BENZONATATE 100 MG/1
100 CAPSULE ORAL EVERY 6 HOURS
Qty: 40 | Refills: 0 | Status: ACTIVE | COMMUNITY
Start: 2025-01-25 | End: 1900-01-01

## 2025-01-28 ENCOUNTER — RX RENEWAL (OUTPATIENT)
Age: 75
End: 2025-01-28

## 2025-02-10 ENCOUNTER — APPOINTMENT (OUTPATIENT)
Dept: PULMONOLOGY | Facility: CLINIC | Age: 75
End: 2025-02-10
Payer: MEDICARE

## 2025-02-10 VITALS
OXYGEN SATURATION: 99 % | SYSTOLIC BLOOD PRESSURE: 133 MMHG | HEART RATE: 68 BPM | WEIGHT: 208 LBS | DIASTOLIC BLOOD PRESSURE: 81 MMHG | BODY MASS INDEX: 30.81 KG/M2 | HEIGHT: 69 IN

## 2025-02-10 DIAGNOSIS — J44.9 CHRONIC OBSTRUCTIVE PULMONARY DISEASE, UNSPECIFIED: ICD-10-CM

## 2025-02-10 DIAGNOSIS — F17.200 NICOTINE DEPENDENCE, UNSPECIFIED, UNCOMPLICATED: ICD-10-CM

## 2025-02-10 PROCEDURE — 94727 GAS DIL/WSHOT DETER LNG VOL: CPT

## 2025-02-10 PROCEDURE — 94729 DIFFUSING CAPACITY: CPT

## 2025-02-10 PROCEDURE — 99214 OFFICE O/P EST MOD 30 MIN: CPT | Mod: 25

## 2025-02-10 PROCEDURE — 99406 BEHAV CHNG SMOKING 3-10 MIN: CPT

## 2025-02-10 PROCEDURE — ZZZZZ: CPT

## 2025-02-10 PROCEDURE — G0296 VISIT TO DETERM LDCT ELIG: CPT

## 2025-02-10 PROCEDURE — 94010 BREATHING CAPACITY TEST: CPT

## 2025-04-21 ENCOUNTER — OUTPATIENT (OUTPATIENT)
Dept: OUTPATIENT SERVICES | Facility: HOSPITAL | Age: 75
LOS: 1 days | End: 2025-04-21
Payer: MEDICARE

## 2025-04-21 ENCOUNTER — NON-APPOINTMENT (OUTPATIENT)
Age: 75
End: 2025-04-21

## 2025-04-21 ENCOUNTER — APPOINTMENT (OUTPATIENT)
Dept: CT IMAGING | Facility: CLINIC | Age: 75
End: 2025-04-21

## 2025-04-21 VITALS — HEIGHT: 69 IN | BODY MASS INDEX: 30.81 KG/M2 | WEIGHT: 208 LBS

## 2025-04-21 DIAGNOSIS — F17.200 NICOTINE DEPENDENCE, UNSPECIFIED, UNCOMPLICATED: ICD-10-CM

## 2025-04-21 PROCEDURE — 71271 CT THORAX LUNG CANCER SCR C-: CPT

## 2025-04-21 PROCEDURE — 71271 CT THORAX LUNG CANCER SCR C-: CPT | Mod: 26

## 2025-04-22 ENCOUNTER — APPOINTMENT (OUTPATIENT)
Dept: PULMONOLOGY | Facility: CLINIC | Age: 75
End: 2025-04-22
Payer: MEDICARE

## 2025-04-22 VITALS — DIASTOLIC BLOOD PRESSURE: 74 MMHG | HEART RATE: 68 BPM | SYSTOLIC BLOOD PRESSURE: 132 MMHG | OXYGEN SATURATION: 98 %

## 2025-04-22 DIAGNOSIS — J44.9 CHRONIC OBSTRUCTIVE PULMONARY DISEASE, UNSPECIFIED: ICD-10-CM

## 2025-04-22 DIAGNOSIS — F17.200 NICOTINE DEPENDENCE, UNSPECIFIED, UNCOMPLICATED: ICD-10-CM

## 2025-04-22 PROCEDURE — 99213 OFFICE O/P EST LOW 20 MIN: CPT

## 2025-04-22 PROCEDURE — G2211 COMPLEX E/M VISIT ADD ON: CPT

## 2025-04-23 NOTE — ED ADULT TRIAGE NOTE - ESI TRIAGE ACUITY LEVEL, MLM
Refill request received for Ondansetron    Last office visit: 4/4/2025  Next office visit: 7/8/2025  Last Dispensed: 04.07.25         3